# Patient Record
Sex: MALE | Race: WHITE | HISPANIC OR LATINO
[De-identification: names, ages, dates, MRNs, and addresses within clinical notes are randomized per-mention and may not be internally consistent; named-entity substitution may affect disease eponyms.]

---

## 2017-01-05 ENCOUNTER — MEDICATION RENEWAL (OUTPATIENT)
Age: 67
End: 2017-01-05

## 2017-03-09 ENCOUNTER — MEDICATION RENEWAL (OUTPATIENT)
Age: 67
End: 2017-03-09

## 2017-03-16 ENCOUNTER — APPOINTMENT (OUTPATIENT)
Dept: ENDOCRINOLOGY | Facility: CLINIC | Age: 67
End: 2017-03-16

## 2017-03-16 VITALS
HEART RATE: 78 BPM | SYSTOLIC BLOOD PRESSURE: 130 MMHG | HEIGHT: 67 IN | TEMPERATURE: 97.1 F | BODY MASS INDEX: 28.25 KG/M2 | DIASTOLIC BLOOD PRESSURE: 70 MMHG | WEIGHT: 180 LBS

## 2017-03-31 ENCOUNTER — MEDICATION RENEWAL (OUTPATIENT)
Age: 67
End: 2017-03-31

## 2017-05-08 LAB
ALBUMIN SERPL ELPH-MCNC: 4.1 G/DL
ALP BLD-CCNC: 59 U/L
ALT SERPL-CCNC: 20 U/L
ANION GAP SERPL CALC-SCNC: 9 MMOL/L
AST SERPL-CCNC: 22 U/L
BASOPHILS # BLD AUTO: 0.02 K/UL
BASOPHILS NFR BLD AUTO: 0.3 %
BILIRUB SERPL-MCNC: 0.2 MG/DL
BUN SERPL-MCNC: 19 MG/DL
CALCIUM SERPL-MCNC: 8.9 MG/DL
CHLORIDE SERPL-SCNC: 103 MMOL/L
CO2 SERPL-SCNC: 28 MMOL/L
CREAT SERPL-MCNC: 1.18 MG/DL
EOSINOPHIL # BLD AUTO: 0.62 K/UL
EOSINOPHIL NFR BLD AUTO: 8.6 %
GLUCOSE SERPL-MCNC: 138 MG/DL
HBA1C MFR BLD HPLC: 6.9 %
HCT VFR BLD CALC: 39.8 %
HGB BLD-MCNC: 13.2 G/DL
IMM GRANULOCYTES NFR BLD AUTO: 0.1 %
LYMPHOCYTES # BLD AUTO: 2.16 K/UL
LYMPHOCYTES NFR BLD AUTO: 29.8 %
MAN DIFF?: NORMAL
MCHC RBC-ENTMCNC: 30.7 PG
MCHC RBC-ENTMCNC: 33.2 GM/DL
MCV RBC AUTO: 92.6 FL
MONOCYTES # BLD AUTO: 0.57 K/UL
MONOCYTES NFR BLD AUTO: 7.9 %
NEUTROPHILS # BLD AUTO: 3.87 K/UL
NEUTROPHILS NFR BLD AUTO: 53.3 %
PLATELET # BLD AUTO: 238 K/UL
POTASSIUM SERPL-SCNC: 4.7 MMOL/L
PROT SERPL-MCNC: 6.3 G/DL
RBC # BLD: 4.3 M/UL
RBC # FLD: 14.1 %
SODIUM SERPL-SCNC: 140 MMOL/L
THYROPEROXIDASE AB SERPL IA-ACNC: <10 IU/ML
TSH SERPL-ACNC: 2.71 UIU/ML
VIT B12 SERPL-MCNC: 660 PG/ML
WBC # FLD AUTO: 7.25 K/UL

## 2017-05-15 ENCOUNTER — APPOINTMENT (OUTPATIENT)
Dept: ENDOCRINOLOGY | Facility: CLINIC | Age: 67
End: 2017-05-15

## 2017-05-15 VITALS
DIASTOLIC BLOOD PRESSURE: 80 MMHG | HEART RATE: 65 BPM | BODY MASS INDEX: 28.88 KG/M2 | OXYGEN SATURATION: 98 % | SYSTOLIC BLOOD PRESSURE: 110 MMHG | HEIGHT: 67 IN | WEIGHT: 184 LBS | RESPIRATION RATE: 18 BRPM

## 2017-07-18 ENCOUNTER — INPATIENT (INPATIENT)
Facility: HOSPITAL | Age: 67
LOS: 2 days | Discharge: HOME | DRG: 389 | End: 2017-07-21
Attending: SURGERY | Admitting: SURGERY
Payer: COMMERCIAL

## 2017-07-18 VITALS
OXYGEN SATURATION: 97 % | RESPIRATION RATE: 18 BRPM | TEMPERATURE: 98 F | SYSTOLIC BLOOD PRESSURE: 154 MMHG | DIASTOLIC BLOOD PRESSURE: 97 MMHG | WEIGHT: 175.93 LBS | HEIGHT: 70 IN | HEART RATE: 101 BPM

## 2017-07-18 LAB
ALBUMIN SERPL ELPH-MCNC: 4 G/DL — SIGNIFICANT CHANGE UP (ref 3.5–5)
ALP SERPL-CCNC: 80 U/L — SIGNIFICANT CHANGE UP (ref 40–120)
ALT FLD-CCNC: 219 U/L DA — HIGH (ref 10–60)
ANION GAP SERPL CALC-SCNC: 12 MMOL/L — SIGNIFICANT CHANGE UP (ref 5–17)
APPEARANCE UR: CLEAR — SIGNIFICANT CHANGE UP
AST SERPL-CCNC: 70 U/L — HIGH (ref 10–40)
BACTERIA # UR AUTO: ABNORMAL /HPF
BASOPHILS # BLD AUTO: 0.1 K/UL — SIGNIFICANT CHANGE UP (ref 0–0.2)
BASOPHILS NFR BLD AUTO: 0.7 % — SIGNIFICANT CHANGE UP (ref 0–2)
BILIRUB SERPL-MCNC: 0.7 MG/DL — SIGNIFICANT CHANGE UP (ref 0.2–1.2)
BILIRUB UR-MCNC: NEGATIVE — SIGNIFICANT CHANGE UP
BUN SERPL-MCNC: 44 MG/DL — HIGH (ref 7–18)
CALCIUM SERPL-MCNC: 9.2 MG/DL — SIGNIFICANT CHANGE UP (ref 8.4–10.5)
CHLORIDE SERPL-SCNC: 90 MMOL/L — LOW (ref 96–108)
CO2 SERPL-SCNC: 29 MMOL/L — SIGNIFICANT CHANGE UP (ref 22–31)
COLOR SPEC: YELLOW — SIGNIFICANT CHANGE UP
CREAT SERPL-MCNC: 2.2 MG/DL — HIGH (ref 0.5–1.3)
DIFF PNL FLD: ABNORMAL
EOSINOPHIL # BLD AUTO: 0 K/UL — SIGNIFICANT CHANGE UP (ref 0–0.5)
EOSINOPHIL NFR BLD AUTO: 0.1 % — SIGNIFICANT CHANGE UP (ref 0–6)
EPI CELLS # UR: ABNORMAL (ref 0–10)
GLUCOSE SERPL-MCNC: 392 MG/DL — HIGH (ref 70–99)
GLUCOSE UR QL: 1000 MG/DL
GRAN CASTS # UR COMP ASSIST: ABNORMAL
HCT VFR BLD CALC: 47.6 % — SIGNIFICANT CHANGE UP (ref 39–50)
HGB BLD-MCNC: 16.2 G/DL — SIGNIFICANT CHANGE UP (ref 13–17)
HYALINE CASTS # UR AUTO: ABNORMAL
KETONES UR-MCNC: ABNORMAL
LACTATE SERPL-SCNC: 3.1 MMOL/L — HIGH (ref 0.7–2)
LEUKOCYTE ESTERASE UR-ACNC: NEGATIVE — SIGNIFICANT CHANGE UP
LIDOCAIN IGE QN: 124 U/L — SIGNIFICANT CHANGE UP (ref 73–393)
LYMPHOCYTES # BLD AUTO: 1 K/UL — SIGNIFICANT CHANGE UP (ref 1–3.3)
LYMPHOCYTES # BLD AUTO: 13.4 % — SIGNIFICANT CHANGE UP (ref 13–44)
MCHC RBC-ENTMCNC: 31.8 PG — SIGNIFICANT CHANGE UP (ref 27–34)
MCHC RBC-ENTMCNC: 34.1 GM/DL — SIGNIFICANT CHANGE UP (ref 32–36)
MCV RBC AUTO: 93.5 FL — SIGNIFICANT CHANGE UP (ref 80–100)
MONOCYTES # BLD AUTO: 0.9 K/UL — SIGNIFICANT CHANGE UP (ref 0–0.9)
MONOCYTES NFR BLD AUTO: 13 % — SIGNIFICANT CHANGE UP (ref 2–14)
NEUTROPHILS # BLD AUTO: 5.2 K/UL — SIGNIFICANT CHANGE UP (ref 1.8–7.4)
NEUTROPHILS NFR BLD AUTO: 72.8 % — SIGNIFICANT CHANGE UP (ref 43–77)
NITRITE UR-MCNC: NEGATIVE — SIGNIFICANT CHANGE UP
PH UR: 5 — SIGNIFICANT CHANGE UP (ref 5–8)
PLATELET # BLD AUTO: 165 K/UL — SIGNIFICANT CHANGE UP (ref 150–400)
POTASSIUM SERPL-MCNC: 3.6 MMOL/L — SIGNIFICANT CHANGE UP (ref 3.5–5.3)
POTASSIUM SERPL-SCNC: 3.6 MMOL/L — SIGNIFICANT CHANGE UP (ref 3.5–5.3)
PROT SERPL-MCNC: 8.7 G/DL — HIGH (ref 6–8.3)
PROT UR-MCNC: 100
RBC # BLD: 5.09 M/UL — SIGNIFICANT CHANGE UP (ref 4.2–5.8)
RBC # FLD: 12.2 % — SIGNIFICANT CHANGE UP (ref 10.3–14.5)
RBC CASTS # UR COMP ASSIST: ABNORMAL /HPF (ref 0–2)
SODIUM SERPL-SCNC: 131 MMOL/L — LOW (ref 135–145)
SP GR SPEC: 1.02 — SIGNIFICANT CHANGE UP (ref 1.01–1.02)
UROBILINOGEN FLD QL: NEGATIVE — SIGNIFICANT CHANGE UP
WBC # BLD: 7.2 K/UL — SIGNIFICANT CHANGE UP (ref 3.8–10.5)
WBC # FLD AUTO: 7.2 K/UL — SIGNIFICANT CHANGE UP (ref 3.8–10.5)
WBC UR QL: SIGNIFICANT CHANGE UP /HPF (ref 0–5)

## 2017-07-18 PROCEDURE — 74020: CPT | Mod: 26

## 2017-07-18 PROCEDURE — 74176 CT ABD & PELVIS W/O CONTRAST: CPT | Mod: 26

## 2017-07-18 RX ORDER — SODIUM CHLORIDE 9 MG/ML
3 INJECTION INTRAMUSCULAR; INTRAVENOUS; SUBCUTANEOUS ONCE
Qty: 0 | Refills: 0 | Status: COMPLETED | OUTPATIENT
Start: 2017-07-18 | End: 2017-07-18

## 2017-07-18 RX ORDER — FAMOTIDINE 10 MG/ML
20 INJECTION INTRAVENOUS ONCE
Qty: 0 | Refills: 0 | Status: COMPLETED | OUTPATIENT
Start: 2017-07-18 | End: 2017-07-18

## 2017-07-18 RX ORDER — IBUPROFEN 200 MG
1 TABLET ORAL
Qty: 20 | Refills: 0 | OUTPATIENT
Start: 2017-07-18 | End: 2017-08-17

## 2017-07-18 RX ORDER — ONDANSETRON 8 MG/1
4 TABLET, FILM COATED ORAL ONCE
Qty: 0 | Refills: 0 | Status: COMPLETED | OUTPATIENT
Start: 2017-07-18 | End: 2017-07-18

## 2017-07-18 RX ORDER — SODIUM CHLORIDE 9 MG/ML
1000 INJECTION INTRAMUSCULAR; INTRAVENOUS; SUBCUTANEOUS
Qty: 0 | Refills: 0 | Status: DISCONTINUED | OUTPATIENT
Start: 2017-07-18 | End: 2017-07-19

## 2017-07-18 RX ORDER — SODIUM CHLORIDE 9 MG/ML
1000 INJECTION INTRAMUSCULAR; INTRAVENOUS; SUBCUTANEOUS ONCE
Qty: 0 | Refills: 0 | Status: COMPLETED | OUTPATIENT
Start: 2017-07-18 | End: 2017-07-18

## 2017-07-18 RX ADMIN — SODIUM CHLORIDE 1000 MILLILITER(S): 9 INJECTION INTRAMUSCULAR; INTRAVENOUS; SUBCUTANEOUS at 16:53

## 2017-07-18 RX ADMIN — FAMOTIDINE 20 MILLIGRAM(S): 10 INJECTION INTRAVENOUS at 16:53

## 2017-07-18 RX ADMIN — SODIUM CHLORIDE 125 MILLILITER(S): 9 INJECTION INTRAMUSCULAR; INTRAVENOUS; SUBCUTANEOUS at 17:32

## 2017-07-18 RX ADMIN — SODIUM CHLORIDE 3 MILLILITER(S): 9 INJECTION INTRAMUSCULAR; INTRAVENOUS; SUBCUTANEOUS at 16:53

## 2017-07-18 RX ADMIN — ONDANSETRON 4 MILLIGRAM(S): 8 TABLET, FILM COATED ORAL at 16:53

## 2017-07-18 NOTE — ED PROVIDER NOTE - OBJECTIVE STATEMENT
68 y/o M pt w/ PMHx of liver abscess and DM presents to ED c/o diarrhea (brown) x3 days and vomiting (1 episode) 2 days ago. Pt notes that he has been on a light diet for the last 3 days. Pt's blood sugar was 360-390 today; pt saw his PMD (Dr. Tran) who sent him to the ED for possible dehydration. Pt denies abd pain, fever, chills, dysuria, hematuria, cough, or any other complaints. Pt reports normal flatus. Pt states that he recently traveled to [place] (returned 5 days ago). NKDA.

## 2017-07-18 NOTE — ED ADULT NURSE NOTE - OBJECTIVE STATEMENT
As per pt, "I started having diarrhea on Saturday. On Monday after hicupping I threw up. When I threw up it seemed like I threw up everything because I threw up my Janumet tablets that I took on Saturday." Pt was recommended to go to the ER my PCP. Currently denies N/V and abd pain.

## 2017-07-19 DIAGNOSIS — K56.69 OTHER INTESTINAL OBSTRUCTION: ICD-10-CM

## 2017-07-19 DIAGNOSIS — K75.0 ABSCESS OF LIVER: Chronic | ICD-10-CM

## 2017-07-19 DIAGNOSIS — E11.9 TYPE 2 DIABETES MELLITUS WITHOUT COMPLICATIONS: ICD-10-CM

## 2017-07-19 DIAGNOSIS — N17.9 ACUTE KIDNEY FAILURE, UNSPECIFIED: ICD-10-CM

## 2017-07-19 LAB
ANION GAP SERPL CALC-SCNC: 8 MMOL/L — SIGNIFICANT CHANGE UP (ref 5–17)
BUN SERPL-MCNC: 27 MG/DL — HIGH (ref 7–18)
CALCIUM SERPL-MCNC: 7.4 MG/DL — LOW (ref 8.4–10.5)
CHLORIDE SERPL-SCNC: 102 MMOL/L — SIGNIFICANT CHANGE UP (ref 96–108)
CO2 SERPL-SCNC: 29 MMOL/L — SIGNIFICANT CHANGE UP (ref 22–31)
CREAT SERPL-MCNC: 1.31 MG/DL — HIGH (ref 0.5–1.3)
GLUCOSE SERPL-MCNC: 296 MG/DL — HIGH (ref 70–99)
HBA1C BLD-MCNC: 8.1 % — HIGH (ref 4–5.6)
LACTATE SERPL-SCNC: 1.2 MMOL/L — SIGNIFICANT CHANGE UP (ref 0.7–2)
OSMOLALITY UR: 643 MOS/KG — SIGNIFICANT CHANGE UP (ref 50–1200)
POTASSIUM SERPL-MCNC: 3.8 MMOL/L — SIGNIFICANT CHANGE UP (ref 3.5–5.3)
POTASSIUM SERPL-SCNC: 3.8 MMOL/L — SIGNIFICANT CHANGE UP (ref 3.5–5.3)
PROT ?TM UR-MCNC: 42 MG/DL — HIGH (ref 0–12)
SODIUM SERPL-SCNC: 139 MMOL/L — SIGNIFICANT CHANGE UP (ref 135–145)
SODIUM UR-SCNC: 90 MMOL/L — SIGNIFICANT CHANGE UP (ref 40–220)

## 2017-07-19 PROCEDURE — 99223 1ST HOSP IP/OBS HIGH 75: CPT

## 2017-07-19 PROCEDURE — 74020: CPT | Mod: 26

## 2017-07-19 PROCEDURE — 99285 EMERGENCY DEPT VISIT HI MDM: CPT

## 2017-07-19 RX ORDER — DEXTROSE 50 % IN WATER 50 %
25 SYRINGE (ML) INTRAVENOUS ONCE
Qty: 0 | Refills: 0 | Status: DISCONTINUED | OUTPATIENT
Start: 2017-07-19 | End: 2017-07-21

## 2017-07-19 RX ORDER — SODIUM CHLORIDE 9 MG/ML
1000 INJECTION, SOLUTION INTRAVENOUS
Qty: 0 | Refills: 0 | Status: DISCONTINUED | OUTPATIENT
Start: 2017-07-19 | End: 2017-07-20

## 2017-07-19 RX ORDER — MORPHINE SULFATE 50 MG/1
2 CAPSULE, EXTENDED RELEASE ORAL EVERY 4 HOURS
Qty: 0 | Refills: 0 | Status: DISCONTINUED | OUTPATIENT
Start: 2017-07-19 | End: 2017-07-21

## 2017-07-19 RX ORDER — SODIUM CHLORIDE 9 MG/ML
1000 INJECTION, SOLUTION INTRAVENOUS
Qty: 0 | Refills: 0 | Status: DISCONTINUED | OUTPATIENT
Start: 2017-07-19 | End: 2017-07-21

## 2017-07-19 RX ORDER — INSULIN GLARGINE 100 [IU]/ML
10 INJECTION, SOLUTION SUBCUTANEOUS EVERY MORNING
Qty: 0 | Refills: 0 | Status: DISCONTINUED | OUTPATIENT
Start: 2017-07-19 | End: 2017-07-21

## 2017-07-19 RX ORDER — HEPARIN SODIUM 5000 [USP'U]/ML
5000 INJECTION INTRAVENOUS; SUBCUTANEOUS EVERY 8 HOURS
Qty: 0 | Refills: 0 | Status: DISCONTINUED | OUTPATIENT
Start: 2017-07-19 | End: 2017-07-21

## 2017-07-19 RX ORDER — DEXTROSE 50 % IN WATER 50 %
12.5 SYRINGE (ML) INTRAVENOUS ONCE
Qty: 0 | Refills: 0 | Status: DISCONTINUED | OUTPATIENT
Start: 2017-07-19 | End: 2017-07-21

## 2017-07-19 RX ORDER — INSULIN LISPRO 100/ML
VIAL (ML) SUBCUTANEOUS
Qty: 0 | Refills: 0 | Status: DISCONTINUED | OUTPATIENT
Start: 2017-07-19 | End: 2017-07-19

## 2017-07-19 RX ORDER — ONDANSETRON 8 MG/1
4 TABLET, FILM COATED ORAL EVERY 6 HOURS
Qty: 0 | Refills: 0 | Status: DISCONTINUED | OUTPATIENT
Start: 2017-07-19 | End: 2017-07-21

## 2017-07-19 RX ORDER — DEXTROSE 50 % IN WATER 50 %
1 SYRINGE (ML) INTRAVENOUS ONCE
Qty: 0 | Refills: 0 | Status: DISCONTINUED | OUTPATIENT
Start: 2017-07-19 | End: 2017-07-21

## 2017-07-19 RX ORDER — HYDRALAZINE HCL 50 MG
10 TABLET ORAL EVERY 8 HOURS
Qty: 0 | Refills: 0 | Status: DISCONTINUED | OUTPATIENT
Start: 2017-07-19 | End: 2017-07-21

## 2017-07-19 RX ORDER — GLUCAGON INJECTION, SOLUTION 0.5 MG/.1ML
1 INJECTION, SOLUTION SUBCUTANEOUS ONCE
Qty: 0 | Refills: 0 | Status: DISCONTINUED | OUTPATIENT
Start: 2017-07-19 | End: 2017-07-21

## 2017-07-19 RX ORDER — INSULIN LISPRO 100/ML
VIAL (ML) SUBCUTANEOUS EVERY 6 HOURS
Qty: 0 | Refills: 0 | Status: DISCONTINUED | OUTPATIENT
Start: 2017-07-19 | End: 2017-07-21

## 2017-07-19 RX ORDER — METOPROLOL TARTRATE 50 MG
1 TABLET ORAL
Qty: 0 | Refills: 0 | COMMUNITY

## 2017-07-19 RX ADMIN — HEPARIN SODIUM 5000 UNIT(S): 5000 INJECTION INTRAVENOUS; SUBCUTANEOUS at 05:42

## 2017-07-19 RX ADMIN — Medication 4: at 12:05

## 2017-07-19 RX ADMIN — INSULIN GLARGINE 10 UNIT(S): 100 INJECTION, SOLUTION SUBCUTANEOUS at 09:33

## 2017-07-19 RX ADMIN — SODIUM CHLORIDE 150 MILLILITER(S): 9 INJECTION, SOLUTION INTRAVENOUS at 22:20

## 2017-07-19 RX ADMIN — Medication 6: at 06:25

## 2017-07-19 RX ADMIN — Medication 2: at 17:19

## 2017-07-19 RX ADMIN — HEPARIN SODIUM 5000 UNIT(S): 5000 INJECTION INTRAVENOUS; SUBCUTANEOUS at 22:15

## 2017-07-19 RX ADMIN — SODIUM CHLORIDE 150 MILLILITER(S): 9 INJECTION, SOLUTION INTRAVENOUS at 01:17

## 2017-07-19 NOTE — PROGRESS NOTE ADULT - SUBJECTIVE AND OBJECTIVE BOX
Patient with SBO that is resolving  Passing flatus and having multiple loose bowel movements  Denies abdominal pain, nausea and vomiting  NGT to LWS only putting out 150cc  NPO        T(F): 97.7 (07-19-17 @ 06:09), Max: 98.2 (07-18-17 @ 15:30)  HR: 74 (07-19-17 @ 06:09) (74 - 101)  BP: 132/72 (07-19-17 @ 06:09) (132/72 - 154/97)  RR: 18 (07-19-17 @ 06:09) (18 - 18)  SpO2: 99% (07-19-17 @ 06:09) (96% - 99%)        Nasoenteral Tube: 150 mL        Physical Exam  General: AAOx3, No acute distress  Skin: No jaundice, no icterus  Abdomen: soft, nontender, mildly distended, tympanic to percussion, no rebound tenderness, no guarding, no palpable masses, large midline incisional hernia, loss of domain of abdominal wall  : Normal external genitalia  Extremities: non edematous, no calf pain bilaterally

## 2017-07-19 NOTE — H&P ADULT - HISTORY OF PRESENT ILLNESS
68 y/o male with DM, HTN, h/o liver abscess in 2009; had open surgery here for liver abscess with post-op wound closure secondarily  post-op had developed mult abd ventral hernias which have not given him any problem; pt wears abd binder  Pt comes with c/o diarrhea for several days with an episode of vomiting; also acknowledges flatus  no pain, f/c  CT done in ED read as consistent with a small bowel obstruction. Marked thinning of   the ventral abdominal wall musculature with diastasis of the rectus   abdominis musculature and wide neck ventral abdominal wall hernia.   Numerous dilated proximal small bowel loops within the central abdomen   which enter and exit the hernia sac. Distal small bowel loops are   decompressed at the point of entry into the right lower quadrant ventral   abdominal wall hernia which contains the cecum and terminal ileum. No   mesenteric edema or ascites. No pneumoperitoneum or pneumatosis.

## 2017-07-19 NOTE — H&P ADULT - PROBLEM SELECTOR PLAN 1
npo, cont NGT, hydrate, f/u am BMP, lactate levels, medical consult called for elevated BS/mgmt of HTN, DM; f/u am xray, serial abd exams

## 2017-07-19 NOTE — H&P ADULT - NSHPPHYSICALEXAM_GEN_ALL_CORE
NAD  alert, oriented x3  NGT placed by ED-draining some bilious  S1S2  abd soft, midline scar well healed; upper and lower abd ventral hernias appear reducible  pt is NT/ND with no rebound, guarding or peritoneal signs  ext no c/c/e

## 2017-07-19 NOTE — CONSULT NOTE ADULT - SUBJECTIVE AND OBJECTIVE BOX
Patient is a 67y old  Male who presents with a chief complaint of abd pain, diarrhea; sbo (2017 00:53)      Initial HPI on admission:  HPI:  68 y/o male with DM, HTN, h/o liver abscess in ; had open surgery here for liver abscess with post-op wound closure secondarily  post-op had developed mult abd ventral hernias which have not given him any problem; pt wears abd binder  Pt comes with c/o diarrhea for several days with an episode of vomiting; also acknowledges flatus  no pain, f/c  CT done in ED read as consistent with a small bowel obstruction. Marked thinning of   the ventral abdominal wall musculature with diastasis of the rectus   abdominis musculature and wide neck ventral abdominal wall hernia.   Numerous dilated proximal small bowel loops within the central abdomen   which enter and exit the hernia sac. Distal small bowel loops are   decompressed at the point of entry into the right lower quadrant ventral   abdominal wall hernia which contains the cecum and terminal ileum. No   mesenteric edema or ascites. No pneumoperitoneum or pneumatosis. (2017 00:53)  Medicine Service is being Consulted for management of diabetes and HTN    PAST MEDICAL & SURGICAL HISTORY:  DM (diabetes mellitus)  Liver abscess  Liver abscess: Drainage of liver abscess    Allergies    No Known Allergies    Intolerances      FAMILY HISTORY:    Social history reviewed:     Review of Systems:  CONSTITUTIONAL: No fever, chills, or fatigue  EYES: No eye pain, visual disturbances, or discharge  ENMT:  No difficulty hearing, tinnitus, vertigo; No sinus or throat pain  NECK: No pain or stiffness  RESPIRATORY: No cough, wheezing, chills or hemoptysis; No shortness of breath  CARDIOVASCULAR: No chest pain, palpitations, dizziness, or leg swelling  GASTROINTESTINAL: No abdominal or epigastric pain. No nausea, vomiting, or hematemesis; No diarrhea or constipation. No melena or hematochezia.  GENITOURINARY: No dysuria, frequency, hematuria, or incontinence  NEUROLOGICAL: No headaches, memory loss, loss of strength, numbness, or tremors  SKIN: No itching, burning, rashes, or lesions   MUSCULOSKELETAL: No joint pain or swelling; No muscle, back, or extremity pain  PSYCHIATRIC: No depression, anxiety, mood swings, or difficulty sleeping      Medications:  ondansetron Injectable 4 milliGRAM(s) IV Push every 6 hours PRN  insulin lispro (HumaLOG) corrective regimen sliding scale   SubCutaneous three times a day before meals  dextrose 5%. 1000 milliLiter(s) IV Continuous <Continuous>  dextrose Gel 1 Dose(s) Oral once PRN  dextrose 50% Injectable 12.5 Gram(s) IV Push once  dextrose 50% Injectable 25 Gram(s) IV Push once  dextrose 50% Injectable 25 Gram(s) IV Push once  glucagon  Injectable 1 milliGRAM(s) IntraMuscular once PRN  dextrose 5% + sodium chloride 0.9%. 1000 milliLiter(s) IV Continuous <Continuous>  heparin  Injectable 5000 Unit(s) SubCutaneous every 8 hours  morphine  - Injectable 2 milliGRAM(s) IV Push every 4 hours PRN        Vital Signs Last 24 Hrs  T(C): 36.6 (2017 01:33), Max: 36.8 (2017 15:30)  T(F): 97.8 (:33), Max: 98.2 (2017 15:30)  HR: 87 (2017 01:33) (83 - 101)  BP: 153/83 (2017 01:33) (140/90 - 154/97)  BP(mean): --  RR: 18 (2017 01:33) (18 - 18)  SpO2: 96% (2017 01:33) (96% - 99%)          Physical Examination:    General: No acute distress.      HEENT: Pupils equal, reactive to light.  Symmetric.    PULM: Clear to auscultation bilaterally, no significant sputum production    CVS: Regular rate and rhythm, no murmurs, rubs, or gallops    ABD: Soft, nondistended, nontender, normoactive bowel sounds, no masses    EXT: No edema, nontender    SKIN: Warm and well perfused, no rashes noted.    NEURO: Alert, oriented, interactive, nonfocal      LABS:                        16.2   7.2   )-----------( 165      ( 2017 16:57 )             47.6     07-18    131<L>  |  90<L>  |  44<H>  ----------------------------<  392<H>  3.6   |  29  |  2.20<H>    Ca    9.2      2017 16:57    TPro  8.7<H>  /  Alb  4.0  /  TBili  0.7  /  DBili  x   /  AST  70<H>  /  ALT  219<H>  /  AlkPhos  80            CAPILLARY BLOOD GLUCOSE  254 (2017 23:50)          Urinalysis Basic - ( 2017 16:57 )    Color: Yellow / Appearance: Clear / S.025 / pH: x  Gluc: x / Ketone: Trace  / Bili: Negative / Urobili: Negative   Blood: x / Protein: 100 / Nitrite: Negative   Leuk Esterase: Negative / RBC: 5-10 /HPF / WBC 0-2 /HPF   Sq Epi: x / Non Sq Epi: Few / Bacteria: Moderate /HPF      CULTURES:          RADIOLOGY REVIEWED : < from: CT Abdomen and Pelvis w/ Oral Cont (17 @ 21:24) >    EXAM:  CT ABDOMEN AND PELVIS OC                            PROCEDURE DATE:  2017          INTERPRETATION:  CT OF THE ABDOMEN AND PELVIS DATED 2017.    CLINICAL INFORMATION:  Diarrhea and vomiting for 2 days, history of liver   abscess surgery in the past.    TECHNIQUE: Axial CT images are obtained from the lung bases through the   pubic symphysis without the administration of intravenous contrast. Oral   contrast is administered. Images are reformatted in the sagittal and   coronal planes.    No prior studies are available for comparison.     FINDINGS:    There is patchy groundglass opacity in the right middle lobe which may   represent developing pneumonia. There are groundglass nodules in the   right lower lobe measuring upto 4 mm.    The unenhanced liver is unremarkable. The gallbladder is normally   distended. High attenuation within the gallbladder may represent   sludge/stones or vicarious excretion of contrast from recent IV contrast   enhanced study. There is no biliary ductal dilatation. The pancreas is   mildly atrophic. The unenhanced spleen, adrenal glands, and kidneys are   unremarkable. The urinary bladder is under distended but unremarkable in   appearance. The prostate gland is enlarged.    There is marked thinning of the ventral abdominal wall musculature with   diastasis of the rectus abdominis musculature and a wide neck ventral   abdominal wall hernia. There are numerous dilated proximal small bowel   loops within the central abdomen which enter and exit the hernia sac.   Distal small bowel loops are decompressed at the point of entry into the   right lower ventral abdominal wall hernia which contains the cecum and   terminal ileum. Large bowel is relatively collapsed. There is no   mesenteric edema or ascites. There is no pneumoperitoneum or pneumatosis.   There is colonic diverticulosis without evidence of diverticulitis.    There is no significant abdominal or pelvic lymphadenopathy. There is   mild atherosclerotic calcification of the aortoiliac tree. The abdominal   aorta is normal in caliber.    There are small fat-containing bilateral inguinal hernias.    There are multilevel degenerative changes of the spine with a levoconvex   scoliosis.    IMPRESSION:    Findings consistent with a small bowel obstruction. Marked thinning of   the ventral abdominal wall musculature with diastasis of the rectus   abdominis musculature and wide neck ventral abdominal wall hernia.   Numerous dilated proximal small bowel loops within the central abdomen   which enter and exit the hernia sac. Distal small bowel loops are   decompressed at the point of entry into the right lower quadrant ventral   abdominal wall hernia which contains the cecum and terminal ileum. No   mesenteric edema orascites. No pneumoperitoneum or pneumatosis.    Patchy groundglass opacity in the right middle lobe which may represent   developing pneumonia.                    CHAD MORALES M.D.; ATTENDING RADIOLOGIST  This document has been electronically signed. 2017  9:54PM                < end of copied text >      Assessment and plan:  68 y/o male with DM, HTN, h/o liver abscess in , w/ post-op multiple ventral abdominal hernias p/w abdominal pain, diarrhea and vomiting found to have SBO    1) SBO: NPO, NGT to Sx, IVFs'. Patient is able to pass Gas. Management as per surgery    2) MIKE: unknown baseline. hold Diuretic. f/up urine lytes. Monitor bun/Cr. US renal if Cr does not improve. c/w IVFs  3) Elevated LFts: check hepatitis panel. [place]  4) HTN: c/w home dose toprol XL. hold diuretic for now  5) DM: check Hba1c. c/w HSS for now

## 2017-07-19 NOTE — PATIENT PROFILE ADULT. - NS TRANSFER PATIENT BELONGINGS
Cell Phone/PDA (specify)/Electronic Device (specify)/laptop, luggage, credit cards and money placed in safe/Jewelry/Money (specify)

## 2017-07-19 NOTE — CONSULT NOTE ADULT - SUBJECTIVE AND OBJECTIVE BOX
Subjective and Objective:   Patient is a 67y old  Male who presents with a chief complaint of abd pain, diarrhea; sbo (2017 00:53)      Initial HPI on admission:  HPI:  68 y/o male with DM, HTN, h/o liver abscess in ; had open surgery here for liver abscess with post-op wound closure secondarily  post-op had developed mult abd ventral hernias which have not given him any problem; pt wears abd binder  Pt comes with c/o diarrhea for several days with an episode of vomiting; also acknowledges flatus  no pain, f/c  CT done in ED read as consistent with a small bowel obstruction. Marked thinning of   the ventral abdominal wall musculature with diastasis of the rectus   abdominis musculature and wide neck ventral abdominal wall hernia.   Numerous dilated proximal small bowel loops within the central abdomen   which enter and exit the hernia sac. Distal small bowel loops are   decompressed at the point of entry into the right lower quadrant ventral   abdominal wall hernia which contains the cecum and terminal ileum. No   mesenteric edema or ascites. No pneumoperitoneum or pneumatosis. (2017 00:53)  Medicine Service is being Consulted for management of diabetes and HTN    PAST MEDICAL & SURGICAL HISTORY:  DM (diabetes mellitus)  Liver abscess  Liver abscess: Drainage of liver abscess    Allergies    No Known Allergies    Intolerances      FAMILY HISTORY:    Social history reviewed:     Review of Systems:  CONSTITUTIONAL: No fever, chills, or fatigue  EYES: No eye pain, visual disturbances, or discharge  ENMT:  No difficulty hearing, tinnitus, vertigo; No sinus or throat pain  NECK: No pain or stiffness  RESPIRATORY: No cough, wheezing, chills or hemoptysis; No shortness of breath  CARDIOVASCULAR: No chest pain, palpitations, dizziness, or leg swelling  GASTROINTESTINAL: No abdominal or epigastric pain. No nausea, vomiting, or hematemesis; No diarrhea or constipation. No melena or hematochezia.  GENITOURINARY: No dysuria, frequency, hematuria, or incontinence  NEUROLOGICAL: No headaches, memory loss, loss of strength, numbness, or tremors  SKIN: No itching, burning, rashes, or lesions   MUSCULOSKELETAL: No joint pain or swelling; No muscle, back, or extremity pain  PSYCHIATRIC: No depression, anxiety, mood swings, or difficulty sleeping      Medications:  ondansetron Injectable 4 milliGRAM(s) IV Push every 6 hours PRN  insulin lispro (HumaLOG) corrective regimen sliding scale   SubCutaneous three times a day before meals  dextrose 5%. 1000 milliLiter(s) IV Continuous <Continuous>  dextrose Gel 1 Dose(s) Oral once PRN  dextrose 50% Injectable 12.5 Gram(s) IV Push once  dextrose 50% Injectable 25 Gram(s) IV Push once  dextrose 50% Injectable 25 Gram(s) IV Push once  glucagon  Injectable 1 milliGRAM(s) IntraMuscular once PRN  dextrose 5% + sodium chloride 0.9%. 1000 milliLiter(s) IV Continuous <Continuous>  heparin  Injectable 5000 Unit(s) SubCutaneous every 8 hours  morphine  - Injectable 2 milliGRAM(s) IV Push every 4 hours PRN        Vital Signs Last 24 Hrs  T(C): 36.6 (2017 01:33), Max: 36.8 (2017 15:30)  T(F): 97.8 (:33), Max: 98.2 (2017 15:30)  HR: 87 (:) (83 - 101)  BP: 153/83 (:) (140/90 - 154/97)  BP(mean): --  RR: 18 (2017 01:33) (18 - 18)  SpO2: 96% (2017 01:33) (96% - 99%)          Physical Examination:    General: No acute distress.      HEENT: Pupils equal, reactive to light.  Symmetric.    PULM: Clear to auscultation bilaterally, no significant sputum production    CVS: Regular rate and rhythm, no murmurs, rubs, or gallops    ABD: Soft, nondistended, nontender, normoactive bowel sounds, no masses    EXT: No edema, nontender    SKIN: Warm and well perfused, no rashes noted.    NEURO: Alert, oriented, interactive, nonfocal      LABS:                        16.2   7.2   )-----------( 165      ( 2017 16:57 )             47.6     07-18    131<L>  |  90<L>  |  44<H>  ----------------------------<  392<H>  3.6   |  29  |  2.20<H>    Ca    9.2      2017 16:57    TPro  8.7<H>  /  Alb  4.0  /  TBili  0.7  /  DBili  x   /  AST  70<H>  /  ALT  219<H>  /  AlkPhos  80            CAPILLARY BLOOD GLUCOSE  254 (2017 23:50)          Urinalysis Basic - ( 2017 16:57 )    Color: Yellow / Appearance: Clear / S.025 / pH: x  Gluc: x / Ketone: Trace  / Bili: Negative / Urobili: Negative   Blood: x / Protein: 100 / Nitrite: Negative   Leuk Esterase: Negative / RBC: 5-10 /HPF / WBC 0-2 /HPF   Sq Epi: x / Non Sq Epi: Few / Bacteria: Moderate /HPF      CULTURES:          RADIOLOGY REVIEWED : < from: CT Abdomen and Pelvis w/ Oral Cont (17 @ 21:24) >    EXAM:  CT ABDOMEN AND PELVIS OC                            PROCEDURE DATE:  2017          INTERPRETATION:  CT OF THE ABDOMEN AND PELVIS DATED 2017.    CLINICAL INFORMATION:  Diarrhea and vomiting for 2 days, history of liver   abscess surgery in the past.    TECHNIQUE: Axial CT images are obtained from the lung bases through the   pubic symphysis without the administration of intravenous contrast. Oral   contrast is administered. Images are reformatted in the sagittal and   coronal planes.    No prior studies are available for comparison.     FINDINGS:    There is patchy groundglass opacity in the right middle lobe which may   represent developing pneumonia. There are groundglass nodules in the   right lower lobe measuring upto 4 mm.    The unenhanced liver is unremarkable. The gallbladder is normally   distended. High attenuation within the gallbladder may represent   sludge/stones or vicarious excretion of contrast from recent IV contrast   enhanced study. There is no biliary ductal dilatation. The pancreas is   mildly atrophic. The unenhanced spleen, adrenal glands, and kidneys are   unremarkable. The urinary bladder is under distended but unremarkable in   appearance. The prostate gland is enlarged.    There is marked thinning of the ventral abdominal wall musculature with   diastasis of the rectus abdominis musculature and a wide neck ventral   abdominal wall hernia. There are numerous dilated proximal small bowel   loops within the central abdomen which enter and exit the hernia sac.   Distal small bowel loops are decompressed at the point of entry into the   right lower ventral abdominal wall hernia which contains the cecum and   terminal ileum. Large bowel is relatively collapsed. There is no   mesenteric edema or ascites. There is no pneumoperitoneum or pneumatosis.   There is colonic diverticulosis without evidence of diverticulitis.    There is no significant abdominal or pelvic lymphadenopathy. There is   mild atherosclerotic calcification of the aortoiliac tree. The abdominal   aorta is normal in caliber.    There are small fat-containing bilateral inguinal hernias.    There are multilevel degenerative changes of the spine with a levoconvex   scoliosis.    IMPRESSION:    Findings consistent with a small bowel obstruction. Marked thinning of   the ventral abdominal wall musculature with diastasis of the rectus   abdominis musculature and wide neck ventral abdominal wall hernia.   Numerous dilated proximal small bowel loops within the central abdomen   which enter and exit the hernia sac. Distal small bowel loops are   decompressed at the point of entry into the right lower quadrant ventral   abdominal wall hernia which contains the cecum and terminal ileum. No   mesenteric edema orascites. No pneumoperitoneum or pneumatosis.    Patchy groundglass opacity in the right middle lobe which may represent   developing pneumonia.                    CHAD MORALES M.D.; ATTENDING RADIOLOGIST  This document has been electronically signed. 2017  9:54PM           Assessment and plan:  68 y/o male with DM, HTN, h/o liver abscess in , w/ post-op multiple ventral abdominal hernias p/w abdominal pain, diarrhea and vomiting found to have SBO    1) SBO: NPO, NGT to Sx, IVFs'. Patient is able to pass Gas. Management as per surgery    2) MIKE: unknown baseline. will hold Diuretic. f/up Urine lytes. Monitor bun/Cr. US renal if Cr does not improve. c/w IVFs for now. Send urine lytes    3) Elevated LFTs: check hepatitis panel. [facility]ior    4) HTN: holding Toprol and HCTZ for now until patient is NPO. c/w IV hydralazine IV q 8 hours PRN with parameters    5) DM: check Hba1c. c/w HSS for now. holding PO Janumet    6) DVT ppx Subjective and Objective:   Patient is a 67y old  Male who presents with a chief complaint of abd pain, diarrhea; sbo (2017 00:53)      Initial HPI on admission:  HPI:  66 y/o male with DM, HTN, h/o liver abscess in ; had open surgery here for liver abscess with post-op wound closure secondarily  post-op had developed mult abd ventral hernias which have not given him any problem; pt wears abd binder  Pt comes with c/o diarrhea for several days with an episode of vomiting; also acknowledges flatus  no pain, f/c  CT done in ED read as consistent with a small bowel obstruction. Marked thinning of   the ventral abdominal wall musculature with diastasis of the rectus   abdominis musculature and wide neck ventral abdominal wall hernia.   Numerous dilated proximal small bowel loops within the central abdomen   which enter and exit the hernia sac. Distal small bowel loops are   decompressed at the point of entry into the right lower quadrant ventral   abdominal wall hernia which contains the cecum and terminal ileum. No   mesenteric edema or ascites. No pneumoperitoneum or pneumatosis. (2017 00:53)  Medicine Service is being Consulted for management of diabetes and HTN    PAST MEDICAL & SURGICAL HISTORY:  DM (diabetes mellitus)  Liver abscess  Liver abscess: Drainage of liver abscess    Allergies    No Known Allergies    Intolerances      FAMILY HISTORY:    Social history reviewed:     Review of Systems:  CONSTITUTIONAL: No fever, chills, or fatigue  EYES: No eye pain, visual disturbances, or discharge  ENMT:  No difficulty hearing, tinnitus, vertigo; No sinus or throat pain  NECK: No pain or stiffness  RESPIRATORY: No cough, wheezing, chills or hemoptysis; No shortness of breath  CARDIOVASCULAR: No chest pain, palpitations, dizziness, or leg swelling  GASTROINTESTINAL: abdominal pain, Nausea/vomiting and diarrhea  GENITOURINARY: No dysuria, frequency, hematuria, or incontinence  NEUROLOGICAL: No headaches, memory loss, loss of strength, numbness, or tremors  SKIN: No itching, burning, rashes, or lesions   MUSCULOSKELETAL: No joint pain or swelling; No muscle, back, or extremity pain  PSYCHIATRIC: No depression, anxiety, mood swings, or difficulty sleeping      Medications:  ondansetron Injectable 4 milliGRAM(s) IV Push every 6 hours PRN  insulin lispro (HumaLOG) corrective regimen sliding scale   SubCutaneous three times a day before meals  dextrose 5%. 1000 milliLiter(s) IV Continuous <Continuous>  dextrose Gel 1 Dose(s) Oral once PRN  dextrose 50% Injectable 12.5 Gram(s) IV Push once  dextrose 50% Injectable 25 Gram(s) IV Push once  dextrose 50% Injectable 25 Gram(s) IV Push once  glucagon  Injectable 1 milliGRAM(s) IntraMuscular once PRN  dextrose 5% + sodium chloride 0.9%. 1000 milliLiter(s) IV Continuous <Continuous>  heparin  Injectable 5000 Unit(s) SubCutaneous every 8 hours  morphine  - Injectable 2 milliGRAM(s) IV Push every 4 hours PRN        Vital Signs Last 24 Hrs  T(C): 36.6 (2017 01:33), Max: 36.8 (2017 15:30)  T(F): 97.8 (:33), Max: 98.2 (2017 15:30)  HR: 87 (:33) (83 - 101)  BP: 153/83 (2017 01:33) (140/90 - 154/97)  BP(mean): --  RR: 18 (:33) (18 - 18)  SpO2: 96% (2017 01:33) (96% - 99%)          Physical Examination:    General: No acute distress.      HEENT: Pupils equal, reactive to light.  Symmetric.    PULM: Clear to auscultation bilaterally, no significant sputum production    CVS: Regular rate and rhythm, no murmurs, rubs, or gallops    Abdomen: NGT placed by ED-draining some bilious Abd soft, midline scar well healed; upper and lower abd ventral hernias appear reducible    Ect: no edema/cyanosis      LABS:                        16.2   7.2   )-----------( 165      ( 2017 16:57 )             47.6     07-18    131<L>  |  90<L>  |  44<H>  ----------------------------<  392<H>  3.6   |  29  |  2.20<H>    Ca    9.2      2017 16:57    TPro  8.7<H>  /  Alb  4.0  /  TBili  0.7  /  DBili  x   /  AST  70<H>  /  ALT  219<H>  /  AlkPhos  80  07-18          CAPILLARY BLOOD GLUCOSE  254 (2017 23:50)          Urinalysis Basic - ( 2017 16:57 )    Color: Yellow / Appearance: Clear / S.025 / pH: x  Gluc: x / Ketone: Trace  / Bili: Negative / Urobili: Negative   Blood: x / Protein: 100 / Nitrite: Negative   Leuk Esterase: Negative / RBC: 5-10 /HPF / WBC 0-2 /HPF   Sq Epi: x / Non Sq Epi: Few / Bacteria: Moderate /HPF      CULTURES:          RADIOLOGY REVIEWED : < from: CT Abdomen and Pelvis w/ Oral Cont (17 @ 21:24) >    EXAM:  CT ABDOMEN AND PELVIS OC                            PROCEDURE DATE:  2017          INTERPRETATION:  CT OF THE ABDOMEN AND PELVIS DATED 2017.    CLINICAL INFORMATION:  Diarrhea and vomiting for 2 days, history of liver   abscess surgery in the past.    TECHNIQUE: Axial CT images are obtained from the lung bases through the   pubic symphysis without the administration of intravenous contrast. Oral   contrast is administered. Images are reformatted in the sagittal and   coronal planes.    No prior studies are available for comparison.     FINDINGS:    There is patchy groundglass opacity in the right middle lobe which may   represent developing pneumonia. There are groundglass nodules in the   right lower lobe measuring upto 4 mm.    The unenhanced liver is unremarkable. The gallbladder is normally   distended. High attenuation within the gallbladder may represent   sludge/stones or vicarious excretion of contrast from recent IV contrast   enhanced study. There is no biliary ductal dilatation. The pancreas is   mildly atrophic. The unenhanced spleen, adrenal glands, and kidneys are   unremarkable. The urinary bladder is under distended but unremarkable in   appearance. The prostate gland is enlarged.    There is marked thinning of the ventral abdominal wall musculature with   diastasis of the rectus abdominis musculature and a wide neck ventral   abdominal wall hernia. There are numerous dilated proximal small bowel   loops within the central abdomen which enter and exit the hernia sac.   Distal small bowel loops are decompressed at the point of entry into the   right lower ventral abdominal wall hernia which contains the cecum and   terminal ileum. Large bowel is relatively collapsed. There is no   mesenteric edema or ascites. There is no pneumoperitoneum or pneumatosis.   There is colonic diverticulosis without evidence of diverticulitis.    There is no significant abdominal or pelvic lymphadenopathy. There is   mild atherosclerotic calcification of the aortoiliac tree. The abdominal   aorta is normal in caliber.    There are small fat-containing bilateral inguinal hernias.    There are multilevel degenerative changes of the spine with a levoconvex   scoliosis.    IMPRESSION:    Findings consistent with a small bowel obstruction. Marked thinning of   the ventral abdominal wall musculature with diastasis of the rectus   abdominis musculature and wide neck ventral abdominal wall hernia.   Numerous dilated proximal small bowel loops within the central abdomen   which enter and exit the hernia sac. Distal small bowel loops are   decompressed at the point of entry into the right lower quadrant ventral   abdominal wall hernia which contains the cecum and terminal ileum. No   mesenteric edema orascites. No pneumoperitoneum or pneumatosis.    Patchy groundglass opacity in the right middle lobe which may represent   developing pneumonia.                    CHAD MORALES M.D.; ATTENDING RADIOLOGIST  This document has been electronically signed. 2017  9:54PM           Assessment and plan:  66 y/o male with DM, HTN, h/o liver abscess in , w/ post-op multiple ventral abdominal hernias p/w abdominal pain, diarrhea and vomiting found to have SBO    1) SBO: NPO, NGT to Sx, IVFs'. Patient is able to pass Gas. Management as per surgery    2) MIKE: unknown baseline. will hold Diuretic. f/up Urine lytes. Monitor bun/Cr. US renal if Cr does not improve. c/w IVFs for now. Send urine lytes    3) Elevated LFTs: check hepatitis panel. [facility]ior    4) HTN: holding Toprol and HCTZ for now until patient is NPO. c/w IV hydralazine IV q 8 hours PRN with parameters    5) DM: check Hba1c. c/w HSS for now. holding PO Janumet    6) DVT ppx Subjective and Objective:   Patient is a 67y old  Male who presents with a chief complaint of abd pain, diarrhea; sbo (2017 00:53)      Initial HPI on admission:  HPI:  66 y/o male with DM, HTN, h/o liver abscess in ; had open surgery here for liver abscess with post-op wound closure secondarily  post-op had developed mult abd ventral hernias which have not given him any problem; pt wears abd binder  Pt comes with c/o diarrhea for several days with an episode of vomiting; also acknowledges flatus  no pain, f/c  CT done in ED read as consistent with a small bowel obstruction. Marked thinning of   the ventral abdominal wall musculature with diastasis of the rectus   abdominis musculature and wide neck ventral abdominal wall hernia.   Numerous dilated proximal small bowel loops within the central abdomen   which enter and exit the hernia sac. Distal small bowel loops are   decompressed at the point of entry into the right lower quadrant ventral   abdominal wall hernia which contains the cecum and terminal ileum. No   mesenteric edema or ascites. No pneumoperitoneum or pneumatosis. (2017 00:53)  Medicine Service is being Consulted for management of diabetes and HTN    PAST MEDICAL & SURGICAL HISTORY:  DM (diabetes mellitus)  Liver abscess  Liver abscess: Drainage of liver abscess    Allergies    No Known Allergies    Intolerances      FAMILY HISTORY:    Social history reviewed:     Review of Systems:  CONSTITUTIONAL: No fever, chills, or fatigue  EYES: No eye pain, visual disturbances, or discharge  ENMT:  No difficulty hearing, tinnitus, vertigo; No sinus or throat pain  NECK: No pain or stiffness  RESPIRATORY: No cough, wheezing, chills or hemoptysis; No shortness of breath  CARDIOVASCULAR: No chest pain, palpitations, dizziness, or leg swelling  GASTROINTESTINAL: abdominal pain, Nausea/vomiting and diarrhea  GENITOURINARY: No dysuria, frequency, hematuria, or incontinence  NEUROLOGICAL: No headaches, memory loss, loss of strength, numbness, or tremors  SKIN: No itching, burning, rashes, or lesions   MUSCULOSKELETAL: No joint pain or swelling; No muscle, back, or extremity pain  PSYCHIATRIC: No depression, anxiety, mood swings, or difficulty sleeping      Medications:  ondansetron Injectable 4 milliGRAM(s) IV Push every 6 hours PRN  insulin lispro (HumaLOG) corrective regimen sliding scale   SubCutaneous three times a day before meals  dextrose 5%. 1000 milliLiter(s) IV Continuous <Continuous>  dextrose Gel 1 Dose(s) Oral once PRN  dextrose 50% Injectable 12.5 Gram(s) IV Push once  dextrose 50% Injectable 25 Gram(s) IV Push once  dextrose 50% Injectable 25 Gram(s) IV Push once  glucagon  Injectable 1 milliGRAM(s) IntraMuscular once PRN  dextrose 5% + sodium chloride 0.9%. 1000 milliLiter(s) IV Continuous <Continuous>  heparin  Injectable 5000 Unit(s) SubCutaneous every 8 hours  morphine  - Injectable 2 milliGRAM(s) IV Push every 4 hours PRN        Vital Signs Last 24 Hrs  T(C): 36.6 (2017 01:33), Max: 36.8 (2017 15:30)  T(F): 97.8 (:33), Max: 98.2 (2017 15:30)  HR: 87 (:33) (83 - 101)  BP: 153/83 (2017 01:33) (140/90 - 154/97)  BP(mean): --  RR: 18 (:33) (18 - 18)  SpO2: 96% (2017 01:33) (96% - 99%)          Physical Examination:    General: No acute distress.      HEENT: Pupils equal, reactive to light.  Symmetric.    PULM: Clear to auscultation bilaterally, no significant sputum production    CVS: Regular rate and rhythm, no murmurs, rubs, or gallops    Abdomen: NGT placed by ED-draining some bilious Abd soft, midline scar well healed; upper and lower abd ventral hernias appear reducible    Ect: no edema/cyanosis      LABS:                        16.2   7.2   )-----------( 165      ( 2017 16:57 )             47.6     07-18    131<L>  |  90<L>  |  44<H>  ----------------------------<  392<H>  3.6   |  29  |  2.20<H>    Ca    9.2      2017 16:57    TPro  8.7<H>  /  Alb  4.0  /  TBili  0.7  /  DBili  x   /  AST  70<H>  /  ALT  219<H>  /  AlkPhos  80  07-18          CAPILLARY BLOOD GLUCOSE  254 (2017 23:50)          Urinalysis Basic - ( 2017 16:57 )    Color: Yellow / Appearance: Clear / S.025 / pH: x  Gluc: x / Ketone: Trace  / Bili: Negative / Urobili: Negative   Blood: x / Protein: 100 / Nitrite: Negative   Leuk Esterase: Negative / RBC: 5-10 /HPF / WBC 0-2 /HPF   Sq Epi: x / Non Sq Epi: Few / Bacteria: Moderate /HPF      CULTURES:          RADIOLOGY REVIEWED : < from: CT Abdomen and Pelvis w/ Oral Cont (17 @ 21:24) >    EXAM:  CT ABDOMEN AND PELVIS OC                            PROCEDURE DATE:  2017          INTERPRETATION:  CT OF THE ABDOMEN AND PELVIS DATED 2017.    CLINICAL INFORMATION:  Diarrhea and vomiting for 2 days, history of liver   abscess surgery in the past.    TECHNIQUE: Axial CT images are obtained from the lung bases through the   pubic symphysis without the administration of intravenous contrast. Oral   contrast is administered. Images are reformatted in the sagittal and   coronal planes.    No prior studies are available for comparison.     FINDINGS:    There is patchy groundglass opacity in the right middle lobe which may   represent developing pneumonia. There are groundglass nodules in the   right lower lobe measuring upto 4 mm.    The unenhanced liver is unremarkable. The gallbladder is normally   distended. High attenuation within the gallbladder may represent   sludge/stones or vicarious excretion of contrast from recent IV contrast   enhanced study. There is no biliary ductal dilatation. The pancreas is   mildly atrophic. The unenhanced spleen, adrenal glands, and kidneys are   unremarkable. The urinary bladder is under distended but unremarkable in   appearance. The prostate gland is enlarged.    There is marked thinning of the ventral abdominal wall musculature with   diastasis of the rectus abdominis musculature and a wide neck ventral   abdominal wall hernia. There are numerous dilated proximal small bowel   loops within the central abdomen which enter and exit the hernia sac.   Distal small bowel loops are decompressed at the point of entry into the   right lower ventral abdominal wall hernia which contains the cecum and   terminal ileum. Large bowel is relatively collapsed. There is no   mesenteric edema or ascites. There is no pneumoperitoneum or pneumatosis.   There is colonic diverticulosis without evidence of diverticulitis.    There is no significant abdominal or pelvic lymphadenopathy. There is   mild atherosclerotic calcification of the aortoiliac tree. The abdominal   aorta is normal in caliber.    There are small fat-containing bilateral inguinal hernias.    There are multilevel degenerative changes of the spine with a levoconvex   scoliosis.    IMPRESSION:    Findings consistent with a small bowel obstruction. Marked thinning of   the ventral abdominal wall musculature with diastasis of the rectus   abdominis musculature and wide neck ventral abdominal wall hernia.   Numerous dilated proximal small bowel loops within the central abdomen   which enter and exit the hernia sac. Distal small bowel loops are   decompressed at the point of entry into the right lower quadrant ventral   abdominal wall hernia which contains the cecum and terminal ileum. No   mesenteric edema orascites. No pneumoperitoneum or pneumatosis.    Patchy groundglass opacity in the right middle lobe which may represent   developing pneumonia.                    CHAD MORALES M.D.; ATTENDING RADIOLOGIST  This document has been electronically signed. 2017  9:54PM           Assessment and plan:  66 y/o male with DM, HTN, h/o liver abscess in , w/ post-op multiple ventral abdominal hernias p/w abdominal pain, diarrhea and vomiting found to have SBO    1) SBO: NPO, NGT to Sx, IVFs'. Patient is able to pass Gas. Management as per surgery    2) MIKE: unknown baseline. will hold Diuretic. f/up Urine lytes. Monitor bun/Cr. US renal if Cr does not improve. c/w IVFs for now. Send urine lytes    3) Elevated LFTs: check hepatitis panel. Montior    4) HTN: holding Toprol and HCTZ for now until patient is NPO. c/w IV hydralazine IV q 8 hours PRN with parameters    5) DM: check Hba1c. c/w HSS for now. holding PO Janumet    6) ?PNA: CT scan findings suggest Patchy groundglass opacity in the right middle lobe which may represent developing pneumonia. Patient currently denies any cough, denies fever/chills. Will monitor for now for any signs of fever or symptoms related to PNA.    6) DVT ppx Subjective and Objective:   Patient is a 67y old  Male who presents with a chief complaint of abd pain, diarrhea; sbo (2017 00:53)      Initial HPI on admission:  HPI:  68 y/o male with DM, HTN, h/o liver abscess in ; had open surgery here for liver abscess with post-op wound closure secondarily  post-op had developed mult abd ventral hernias which have not given him any problem; pt wears abd binder  Pt comes with c/o diarrhea for several days with an episode of vomiting; also acknowledges flatus  no pain, f/c  CT done in ED read as consistent with a small bowel obstruction. Marked thinning of   the ventral abdominal wall musculature with diastasis of the rectus   abdominis musculature and wide neck ventral abdominal wall hernia.   Numerous dilated proximal small bowel loops within the central abdomen   which enter and exit the hernia sac. Distal small bowel loops are   decompressed at the point of entry into the right lower quadrant ventral   abdominal wall hernia which contains the cecum and terminal ileum. No   mesenteric edema or ascites. No pneumoperitoneum or pneumatosis. (2017 00:53)  Medicine Service is being Consulted for management of diabetes and HTN    PAST MEDICAL & SURGICAL HISTORY:  DM (diabetes mellitus)  Liver abscess  Liver abscess: Drainage of liver abscess    Allergies    No Known Allergies    Intolerances      FAMILY HISTORY:    Social history reviewed:     Review of Systems:  CONSTITUTIONAL: No fever, chills, or fatigue  EYES: No eye pain, visual disturbances, or discharge  ENMT:  No difficulty hearing, tinnitus, vertigo; No sinus or throat pain  NECK: No pain or stiffness  RESPIRATORY: No cough, wheezing, chills or hemoptysis; No shortness of breath  CARDIOVASCULAR: No chest pain, palpitations, dizziness, or leg swelling  GASTROINTESTINAL: abdominal pain, Nausea/vomiting and diarrhea  GENITOURINARY: No dysuria, frequency, hematuria, or incontinence  NEUROLOGICAL: No headaches, memory loss, loss of strength, numbness, or tremors  SKIN: No itching, burning, rashes, or lesions   MUSCULOSKELETAL: No joint pain or swelling; No muscle, back, or extremity pain  PSYCHIATRIC: No depression, anxiety, mood swings, or difficulty sleeping      Medications:  ondansetron Injectable 4 milliGRAM(s) IV Push every 6 hours PRN  insulin lispro (HumaLOG) corrective regimen sliding scale   SubCutaneous three times a day before meals  dextrose 5%. 1000 milliLiter(s) IV Continuous <Continuous>  dextrose Gel 1 Dose(s) Oral once PRN  dextrose 50% Injectable 12.5 Gram(s) IV Push once  dextrose 50% Injectable 25 Gram(s) IV Push once  dextrose 50% Injectable 25 Gram(s) IV Push once  glucagon  Injectable 1 milliGRAM(s) IntraMuscular once PRN  dextrose 5% + sodium chloride 0.9%. 1000 milliLiter(s) IV Continuous <Continuous>  heparin  Injectable 5000 Unit(s) SubCutaneous every 8 hours  morphine  - Injectable 2 milliGRAM(s) IV Push every 4 hours PRN        Vital Signs Last 24 Hrs  T(C): 36.6 (2017 01:33), Max: 36.8 (2017 15:30)  T(F): 97.8 (:33), Max: 98.2 (2017 15:30)  HR: 87 (:33) (83 - 101)  BP: 153/83 (2017 01:33) (140/90 - 154/97)  BP(mean): --  RR: 18 (:33) (18 - 18)  SpO2: 96% (2017 01:33) (96% - 99%)          Physical Examination:    General: No acute distress.      HEENT: Pupils equal, reactive to light.  Symmetric.    PULM: Clear to auscultation bilaterally, no significant sputum production    CVS: Regular rate and rhythm, no murmurs, rubs, or gallops    Abdomen: NGT placed by ED-draining some bilious Abd soft, midline scar well healed; upper and lower abd ventral hernias appear reducible    Ect: no edema/cyanosis      LABS:                        16.2   7.2   )-----------( 165      ( 2017 16:57 )             47.6     07-18    131<L>  |  90<L>  |  44<H>  ----------------------------<  392<H>  3.6   |  29  |  2.20<H>    Ca    9.2      2017 16:57    TPro  8.7<H>  /  Alb  4.0  /  TBili  0.7  /  DBili  x   /  AST  70<H>  /  ALT  219<H>  /  AlkPhos  80  07-18          CAPILLARY BLOOD GLUCOSE  254 (2017 23:50)          Urinalysis Basic - ( 2017 16:57 )    Color: Yellow / Appearance: Clear / S.025 / pH: x  Gluc: x / Ketone: Trace  / Bili: Negative / Urobili: Negative   Blood: x / Protein: 100 / Nitrite: Negative   Leuk Esterase: Negative / RBC: 5-10 /HPF / WBC 0-2 /HPF   Sq Epi: x / Non Sq Epi: Few / Bacteria: Moderate /HPF      CULTURES:          RADIOLOGY REVIEWED : < from: CT Abdomen and Pelvis w/ Oral Cont (17 @ 21:24) >    EXAM:  CT ABDOMEN AND PELVIS OC                            PROCEDURE DATE:  2017          INTERPRETATION:  CT OF THE ABDOMEN AND PELVIS DATED 2017.    CLINICAL INFORMATION:  Diarrhea and vomiting for 2 days, history of liver   abscess surgery in the past.    TECHNIQUE: Axial CT images are obtained from the lung bases through the   pubic symphysis without the administration of intravenous contrast. Oral   contrast is administered. Images are reformatted in the sagittal and   coronal planes.    No prior studies are available for comparison.     FINDINGS:    There is patchy groundglass opacity in the right middle lobe which may   represent developing pneumonia. There are groundglass nodules in the   right lower lobe measuring upto 4 mm.    The unenhanced liver is unremarkable. The gallbladder is normally   distended. High attenuation within the gallbladder may represent   sludge/stones or vicarious excretion of contrast from recent IV contrast   enhanced study. There is no biliary ductal dilatation. The pancreas is   mildly atrophic. The unenhanced spleen, adrenal glands, and kidneys are   unremarkable. The urinary bladder is under distended but unremarkable in   appearance. The prostate gland is enlarged.    There is marked thinning of the ventral abdominal wall musculature with   diastasis of the rectus abdominis musculature and a wide neck ventral   abdominal wall hernia. There are numerous dilated proximal small bowel   loops within the central abdomen which enter and exit the hernia sac.   Distal small bowel loops are decompressed at the point of entry into the   right lower ventral abdominal wall hernia which contains the cecum and   terminal ileum. Large bowel is relatively collapsed. There is no   mesenteric edema or ascites. There is no pneumoperitoneum or pneumatosis.   There is colonic diverticulosis without evidence of diverticulitis.    There is no significant abdominal or pelvic lymphadenopathy. There is   mild atherosclerotic calcification of the aortoiliac tree. The abdominal   aorta is normal in caliber.    There are small fat-containing bilateral inguinal hernias.    There are multilevel degenerative changes of the spine with a levoconvex   scoliosis.    IMPRESSION:    Findings consistent with a small bowel obstruction. Marked thinning of   the ventral abdominal wall musculature with diastasis of the rectus   abdominis musculature and wide neck ventral abdominal wall hernia.   Numerous dilated proximal small bowel loops within the central abdomen   which enter and exit the hernia sac. Distal small bowel loops are   decompressed at the point of entry into the right lower quadrant ventral   abdominal wall hernia which contains the cecum and terminal ileum. No   mesenteric edema orascites. No pneumoperitoneum or pneumatosis.    Patchy groundglass opacity in the right middle lobe which may represent   developing pneumonia.                    CHAD MORALES M.D.; ATTENDING RADIOLOGIST  This document has been electronically signed. 2017  9:54PM           Assessment and plan:  68 y/o male with DM, HTN, h/o liver abscess in , w/ post-op multiple ventral abdominal hernias p/w abdominal pain, diarrhea and vomiting found to have SBO    1) SBO: NPO, NGT to Sx, IVFs'. Patient is able to pass Gas. Management as per surgery    2) MIKE: unknown baseline. will hold Diuretic. f/up Urine lytes. Monitor bun/Cr. US renal if Cr does not improve. c/w IVFs for now. Send urine lytes    3) Elevated LFTs: check hepatitis panel. [facility]ior    4) HTN: holding Toprol and HCTZ for now until patient is NPO. c/w IV hydralazine IV q 8 hours PRN with parameters    5) DM: check Hba1c. add Lantus 10U daily. c/w HSS for now. holding PO Janumet    6) ?PNA: CT scan findings suggest Patchy groundglass opacity in the right middle lobe which may represent developing pneumonia. Patient currently denies any cough, denies fever/chills. Will monitor for now for any signs of fever or symptoms related to PNA.    6) DVT ppx Subjective and Objective:   Patient is a 67y old  Male who presents with a chief complaint of abd pain, diarrhea; sbo (2017 00:53)  pt well known to me  pt called me for gi symptome/elevated sugar  was asked to come to er  seen /evaluated  found to have sbo    Initial HPI on admission:  HPI:  68 y/o male with DM, HTN, h/o liver abscess in ; had open surgery here for liver abscess with post-op wound closure secondarily  post-op had developed mult abd ventral hernias which have not given him any problem; pt wears abd binder  Pt comes with c/o diarrhea for several days with an episode of vomiting; also acknowledges flatus  no pain, f/c  CT done in ED read as consistent with a small bowel obstruction. Marked thinning of   the ventral abdominal wall musculature with diastasis of the rectus   abdominis musculature and wide neck ventral abdominal wall hernia.   Numerous dilated proximal small bowel loops within the central abdomen   which enter and exit the hernia sac. Distal small bowel loops are   decompressed at the point of entry into the right lower quadrant ventral   abdominal wall hernia which contains the cecum and terminal ileum. No   mesenteric edema or ascites. No pneumoperitoneum or pneumatosis. (2017 00:53)  Medicine Service is being Consulted for management of diabetes and HTN    PAST MEDICAL & SURGICAL HISTORY:  DM (diabetes mellitus)  Liver abscess  Liver abscess: Drainage of liver abscess    Allergies    No Known Allergies    Intolerances      FAMILY HISTORY:    Social history reviewed:     Review of Systems:  CONSTITUTIONAL: No fever, chills, or fatigue  EYES: No eye pain, visual disturbances, or discharge  ENMT:  No difficulty hearing, tinnitus, vertigo; No sinus or throat pain  NECK: No pain or stiffness  RESPIRATORY: No cough, wheezing, chills or hemoptysis; No shortness of breath  CARDIOVASCULAR: No chest pain, palpitations, dizziness, or leg swelling  GASTROINTESTINAL: abdominal pain, Nausea/vomiting and diarrhea  GENITOURINARY: No dysuria, frequency, hematuria, or incontinence  NEUROLOGICAL: No headaches, memory loss, loss of strength, numbness, or tremors  SKIN: No itching, burning, rashes, or lesions   MUSCULOSKELETAL: No joint pain or swelling; No muscle, back, or extremity pain  PSYCHIATRIC: No depression, anxiety, mood swings, or difficulty sleeping      Medications:  ondansetron Injectable 4 milliGRAM(s) IV Push every 6 hours PRN  insulin lispro (HumaLOG) corrective regimen sliding scale   SubCutaneous three times a day before meals  dextrose 5%. 1000 milliLiter(s) IV Continuous <Continuous>  dextrose Gel 1 Dose(s) Oral once PRN  dextrose 50% Injectable 12.5 Gram(s) IV Push once  dextrose 50% Injectable 25 Gram(s) IV Push once  dextrose 50% Injectable 25 Gram(s) IV Push once  glucagon  Injectable 1 milliGRAM(s) IntraMuscular once PRN  dextrose 5% + sodium chloride 0.9%. 1000 milliLiter(s) IV Continuous <Continuous>  heparin  Injectable 5000 Unit(s) SubCutaneous every 8 hours  morphine  - Injectable 2 milliGRAM(s) IV Push every 4 hours PRN        Vital Signs Last 24 Hrs  T(C): 36.6 (2017 01:33), Max: 36.8 (2017 15:30)  T(F): 97.8 (2017 01:33), Max: 98.2 (2017 15:30)  HR: 87 (2017 01:33) (83 - 101)  BP: 153/83 (2017 01:33) (140/90 - 154/97)  BP(mean): --  RR: 18 (2017 01:33) (18 - 18)  SpO2: 96% (2017 01:33) (96% - 99%)          Physical Examination:    General: No acute distress.      HEENT: Pupils equal, reactive to light.  Symmetric.    PULM: Clear to auscultation bilaterally, no significant sputum production    CVS: Regular rate and rhythm, no murmurs, rubs, or gallops    Abdomen: NGT placed by ED-draining some bilious Abd soft, midline scar well healed; upper and lower abd ventral hernias appear reducible    Ect: no edema/cyanosis      LABS:                        16.2   7.2   )-----------( 165      ( 2017 16:57 )             47.6     07-18    131<L>  |  90<L>  |  44<H>  ----------------------------<  392<H>  3.6   |  29  |  2.20<H>    Ca    9.2      2017 16:57    TPro  8.7<H>  /  Alb  4.0  /  TBili  0.7  /  DBili  x   /  AST  70<H>  /  ALT  219<H>  /  AlkPhos  80            CAPILLARY BLOOD GLUCOSE  254 (2017 23:50)          Urinalysis Basic - ( 2017 16:57 )    Color: Yellow / Appearance: Clear / S.025 / pH: x  Gluc: x / Ketone: Trace  / Bili: Negative / Urobili: Negative   Blood: x / Protein: 100 / Nitrite: Negative   Leuk Esterase: Negative / RBC: 5-10 /HPF / WBC 0-2 /HPF   Sq Epi: x / Non Sq Epi: Few / Bacteria: Moderate /HPF      CULTURES:          RADIOLOGY REVIEWED : < from: CT Abdomen and Pelvis w/ Oral Cont (17 @ 21:24) >    EXAM:  CT ABDOMEN AND PELVIS OC                            PROCEDURE DATE:  2017          INTERPRETATION:  CT OF THE ABDOMEN AND PELVIS DATED 2017.    CLINICAL INFORMATION:  Diarrhea and vomiting for 2 days, history of liver   abscess surgery in the past.    TECHNIQUE: Axial CT images are obtained from the lung bases through the   pubic symphysis without the administration of intravenous contrast. Oral   contrast is administered. Images are reformatted in the sagittal and   coronal planes.    No prior studies are available for comparison.     FINDINGS:    There is patchy groundglass opacity in the right middle lobe which may   represent developing pneumonia. There are groundglass nodules in the   right lower lobe measuring upto 4 mm.    The unenhanced liver is unremarkable. The gallbladder is normally   distended. High attenuation within the gallbladder may represent   sludge/stones or vicarious excretion of contrast from recent IV contrast   enhanced study. There is no biliary ductal dilatation. The pancreas is   mildly atrophic. The unenhanced spleen, adrenal glands, and kidneys are   unremarkable. The urinary bladder is under distended but unremarkable in   appearance. The prostate gland is enlarged.    There is marked thinning of the ventral abdominal wall musculature with   diastasis of the rectus abdominis musculature and a wide neck ventral   abdominal wall hernia. There are numerous dilated proximal small bowel   loops within the central abdomen which enter and exit the hernia sac.   Distal small bowel loops are decompressed at the point of entry into the   right lower ventral abdominal wall hernia which contains the cecum and   terminal ileum. Large bowel is relatively collapsed. There is no   mesenteric edema or ascites. There is no pneumoperitoneum or pneumatosis.   There is colonic diverticulosis without evidence of diverticulitis.    There is no significant abdominal or pelvic lymphadenopathy. There is   mild atherosclerotic calcification of the aortoiliac tree. The abdominal   aorta is normal in caliber.    There are small fat-containing bilateral inguinal hernias.    There are multilevel degenerative changes of the spine with a levoconvex   scoliosis.    IMPRESSION:    Findings consistent with a small bowel obstruction. Marked thinning of   the ventral abdominal wall musculature with diastasis of the rectus   abdominis musculature and wide neck ventral abdominal wall hernia.   Numerous dilated proximal small bowel loops within the central abdomen   which enter and exit the hernia sac. Distal small bowel loops are   decompressed at the point of entry into the right lower quadrant ventral   abdominal wall hernia which contains the cecum and terminal ileum. No   mesenteric edema orascites. No pneumoperitoneum or pneumatosis.    Patchy groundglass opacity in the right middle lobe which may represent   developing pneumonia.                    CHAD MORALES M.D.; ATTENDING RADIOLOGIST  This document has been electronically signed. 2017  9:54PM           Assessment and plan:  68 y/o male with DM, HTN, h/o liver abscess in , w/ post-op multiple ventral abdominal hernias p/w abdominal pain, diarrhea and vomiting found to have SBO    1) SBO: NPO, NGT to Sx, IVFs'. Patient is able to pass Gas. Management as per surgery    2) MIKE: unknown baseline. will hold Diuretic. f/up Urine lytes. Monitor bun/Cr. US renal if Cr does not improve. c/w IVFs for now. Send urine lytes    3) Elevated LFTs: check hepatitis panel. [facility]ior    4) HTN: holding Toprol and HCTZ for now until patient is NPO. c/w IV hydralazine IV q 8 hours PRN with parameters    5) DM: check Hba1c. add Lantus 10U daily. c/w HSS for now. holding PO Janumet    6) ?PNA: CT scan findings suggest Patchy groundglass opacity in the right middle lobe which may represent developing pneumonia. Patient currently denies any cough, denies fever/chills. Will monitor for now for any signs of fever or symptoms related to PNA.    6) DVT ppx

## 2017-07-20 LAB
ANION GAP SERPL CALC-SCNC: 9 MMOL/L — SIGNIFICANT CHANGE UP (ref 5–17)
APTT BLD: 25.9 SEC — LOW (ref 27.5–37.4)
BUN SERPL-MCNC: 12 MG/DL — SIGNIFICANT CHANGE UP (ref 7–18)
CALCIUM SERPL-MCNC: 8 MG/DL — LOW (ref 8.4–10.5)
CHLORIDE SERPL-SCNC: 109 MMOL/L — HIGH (ref 96–108)
CO2 SERPL-SCNC: 23 MMOL/L — SIGNIFICANT CHANGE UP (ref 22–31)
CREAT SERPL-MCNC: 1.03 MG/DL — SIGNIFICANT CHANGE UP (ref 0.5–1.3)
GLUCOSE SERPL-MCNC: 192 MG/DL — HIGH (ref 70–99)
HAV IGM SER-ACNC: SIGNIFICANT CHANGE UP
HBV CORE IGM SER-ACNC: SIGNIFICANT CHANGE UP
HBV SURFACE AG SER-ACNC: SIGNIFICANT CHANGE UP
HCT VFR BLD CALC: 37.9 % — LOW (ref 39–50)
HCV AB S/CO SERPL IA: 0.08 S/CO — SIGNIFICANT CHANGE UP
HCV AB SERPL-IMP: SIGNIFICANT CHANGE UP
HGB BLD-MCNC: 12.5 G/DL — LOW (ref 13–17)
INR BLD: 1.08 RATIO — SIGNIFICANT CHANGE UP (ref 0.88–1.16)
MCHC RBC-ENTMCNC: 31.7 PG — SIGNIFICANT CHANGE UP (ref 27–34)
MCHC RBC-ENTMCNC: 33 GM/DL — SIGNIFICANT CHANGE UP (ref 32–36)
MCV RBC AUTO: 96.1 FL — SIGNIFICANT CHANGE UP (ref 80–100)
PLATELET # BLD AUTO: 136 K/UL — LOW (ref 150–400)
POTASSIUM SERPL-MCNC: 3.2 MMOL/L — LOW (ref 3.5–5.3)
POTASSIUM SERPL-SCNC: 3.2 MMOL/L — LOW (ref 3.5–5.3)
PROTHROM AB SERPL-ACNC: 11.8 SEC — SIGNIFICANT CHANGE UP (ref 9.8–12.7)
RBC # BLD: 3.94 M/UL — LOW (ref 4.2–5.8)
RBC # FLD: 12.1 % — SIGNIFICANT CHANGE UP (ref 10.3–14.5)
SODIUM SERPL-SCNC: 141 MMOL/L — SIGNIFICANT CHANGE UP (ref 135–145)
WBC # BLD: 6.7 K/UL — SIGNIFICANT CHANGE UP (ref 3.8–10.5)
WBC # FLD AUTO: 6.7 K/UL — SIGNIFICANT CHANGE UP (ref 3.8–10.5)

## 2017-07-20 PROCEDURE — 99232 SBSQ HOSP IP/OBS MODERATE 35: CPT

## 2017-07-20 RX ORDER — DEXTROSE MONOHYDRATE, SODIUM CHLORIDE, AND POTASSIUM CHLORIDE 50; .745; 4.5 G/1000ML; G/1000ML; G/1000ML
1000 INJECTION, SOLUTION INTRAVENOUS
Qty: 0 | Refills: 0 | Status: DISCONTINUED | OUTPATIENT
Start: 2017-07-20 | End: 2017-07-21

## 2017-07-20 RX ORDER — POTASSIUM CHLORIDE 20 MEQ
10 PACKET (EA) ORAL
Qty: 0 | Refills: 0 | Status: COMPLETED | OUTPATIENT
Start: 2017-07-20 | End: 2017-07-20

## 2017-07-20 RX ADMIN — HEPARIN SODIUM 5000 UNIT(S): 5000 INJECTION INTRAVENOUS; SUBCUTANEOUS at 14:22

## 2017-07-20 RX ADMIN — Medication 100 MILLIEQUIVALENT(S): at 14:21

## 2017-07-20 RX ADMIN — Medication 100 MILLIEQUIVALENT(S): at 11:43

## 2017-07-20 RX ADMIN — Medication 2: at 00:19

## 2017-07-20 RX ADMIN — DEXTROSE MONOHYDRATE, SODIUM CHLORIDE, AND POTASSIUM CHLORIDE 84 MILLILITER(S): 50; .745; 4.5 INJECTION, SOLUTION INTRAVENOUS at 20:00

## 2017-07-20 RX ADMIN — Medication 2: at 11:43

## 2017-07-20 RX ADMIN — Medication 2: at 17:03

## 2017-07-20 RX ADMIN — HEPARIN SODIUM 5000 UNIT(S): 5000 INJECTION INTRAVENOUS; SUBCUTANEOUS at 06:12

## 2017-07-20 RX ADMIN — SODIUM CHLORIDE 150 MILLILITER(S): 9 INJECTION, SOLUTION INTRAVENOUS at 06:14

## 2017-07-20 RX ADMIN — INSULIN GLARGINE 10 UNIT(S): 100 INJECTION, SOLUTION SUBCUTANEOUS at 08:39

## 2017-07-20 RX ADMIN — HEPARIN SODIUM 5000 UNIT(S): 5000 INJECTION INTRAVENOUS; SUBCUTANEOUS at 22:08

## 2017-07-20 RX ADMIN — Medication 100 MILLIEQUIVALENT(S): at 10:38

## 2017-07-20 RX ADMIN — Medication 4: at 06:12

## 2017-07-20 NOTE — PROGRESS NOTE ADULT - SUBJECTIVE AND OBJECTIVE BOX
Patient with SBO that is resolving  Passing flatus and having multiple loose bowel movements  Denies abdominal pain, nausea and vomiting  NGT to LWS only putting out 550cc  NPO      Vital Signs Last 24 Hrs  T(C): 36.8 (20 Jul 2017 06:10), Max: 36.8 (20 Jul 2017 06:10)  T(F): 98.3 (20 Jul 2017 06:10), Max: 98.3 (20 Jul 2017 06:10)  HR: 64 (20 Jul 2017 06:10) (64 - 74)  BP: 133/72 (20 Jul 2017 06:10) (115/45 - 133/72)  BP(mean): --  RR: 17 (20 Jul 2017 06:10) (16 - 17)  SpO2: 99% (20 Jul 2017 06:10) (99% - 100%)                        12.5   6.7   )-----------( 136      ( 20 Jul 2017 07:38 )             37.9   07-20    141  |  109<H>  |  12  ----------------------------<  192<H>  3.2<L>   |  23  |  1.03    Ca    8.0<L>      20 Jul 2017 07:38    TPro  8.7<H>  /  Alb  4.0  /  TBili  0.7  /  DBili  x   /  AST  70<H>  /  ALT  219<H>  /  AlkPhos  80  07-18        Physical Exam  General: AAOx3, No acute distress  Skin: No jaundice, no icterus  Abdomen: soft, nontender, mildly distended, tympanic to percussion, no rebound tenderness, no guarding, no palpable masses, large midline incisional hernia, loss of domain of abdominal wall  : Normal external genitalia  Extremities: non edematous, no calf pain bilaterally

## 2017-07-20 NOTE — PROGRESS NOTE ADULT - ASSESSMENT
1.diabetes  sugars 100-225  npo  iv dextrose/kcl  lantus/humalog  2.htn  3.hyperchol  4.low k  replace

## 2017-07-20 NOTE — PROGRESS NOTE ADULT - SUBJECTIVE AND OBJECTIVE BOX
HPI:  66 y/o male with DM, HTN, h/o liver abscess in 2009; had open surgery here for liver abscess with post-op wound closure secondarily  post-op had developed mult abd ventral hernias which have not given him any problem; pt wears abd binder  Pt comes with c/o diarrhea for several days with an episode of vomiting; also acknowledges flatus  no pain, f/c  CT done in ED read as consistent with a small bowel obstruction. Marked thinning of   the ventral abdominal wall musculature with diastasis of the rectus   abdominis musculature and wide neck ventral abdominal wall hernia.   Numerous dilated proximal small bowel loops within the central abdomen   which enter and exit the hernia sac. Distal small bowel loops are   decompressed at the point of entry into the right lower quadrant ventral   abdominal wall hernia which contains the cecum and terminal ileum. No   mesenteric edema or ascites. No pneumoperitoneum or pneumatosis. (19 Jul 2017 00:53)  pt under surgical care  npo  feels better  no vomiting  passing gas/    Meds:    Allergies:  Allergies    No Known Allergies    Intolerances        REVIEW OF SYSTEMS:  passing gas/no vomiting/no cp no sob  CONSTITUTIONAL: No weakness, fevers or chills  EYES/ENT: No visual changes;  No vertigo or throat pain   NECK: No pain or stiffness  RESPIRATORY: No cough, wheezing, hemoptysis; No shortness of breath  CARDIOVASCULAR: No chest pain or palpitations  GASTROINTESTINAL: No abdominal or epigastric pain. No nausea, vomiting, or hematemesis; No diarrhea or constipation. No melena or hematochezia.  GENITOURINARY: No dysuria, frequency or hematuria  NEUROLOGICAL: No numbness or weakness  SKIN: No itching, burning, rashes, or lesions   All other review of systems is negative unless indicated above.    PHYSICAL EXAM:  awake  lungs ae fair  cardia reg  abd   Vital Signs Last 24 Hrs  T(C): 37 (20 Jul 2017 14:08), Max: 37 (20 Jul 2017 14:08)  T(F): 98.6 (20 Jul 2017 14:08), Max: 98.6 (20 Jul 2017 14:08)  HR: 75 (20 Jul 2017 14:08) (64 - 75)  BP: 128/72 (20 Jul 2017 14:08) (115/45 - 133/72)  BP(mean): --  RR: 16 (20 Jul 2017 14:08) (16 - 17)  SpO2: 98% (20 Jul 2017 14:08) (98% - 100%)    HEENT:    Neck:  [  ] Supple  [  ] Lymphadenopathy  [  ] JVD  [  ] Masses  [  ] WNL    CHEST/Respiratory: [ ] Clear to auscultation     [  ] Rales      [  ] Rhonchi      [  ] Wheezing       [  ] Chest Tenderness     [  ] WNL    Cardiovascular:  [  ]S1 S2  [  ] Reg  [  ] Irreg   [  ] No Murmurs   [  ] Murmurs  [  ] Systolic [  ] Diastolic    Gastrointestinal:  [  ] Bowel Sounds  [   ] ABD Soft  [  ] ABD Distention   [  ] No Tenderness [  ] Tenderness  [  ] Organomegaly  [  ] Guarding rigidity  [  ] No Guarding rigidity  [  ] Rebound Tenderness [  ] No Rebound Tenderness    Extremities: [  ] Edema  [  ] No Edema  [  ] Clubbing   [  ] Cyanosis  [  ] Palpable peripheral pulses                          [  ] Tender calf muscles    [  ] No Tender Calf Muscles    Neurological:  [  ] Alert  [  ] Awake  [  ] Oriented  x                              [  ] Focal weakness  [  ] No Focal Weakness    Skin:  [  ] Thrombophlebitis  [  ] Rashes  [  ] Dry  [  ] Ulcers    Ortho:  [  ] Joint Swelling  [  ] Joint erythema [  ] DJD [  ] Increased Temperature to Touch      LABS/DIAGNOSTIC TESTS                          12.5   6.7   )-----------( 136      ( 20 Jul 2017 07:38 )             37.9         07-20    141  |  109<H>  |  12  ----------------------------<  192<H>  3.2<L>   |  23  |  1.03    Ca    8.0<L>      20 Jul 2017 07:38        CAPILLARY BLOOD GLUCOSE  171 (20 Jul 2017 15:53)  200 (20 Jul 2017 11:18)  209 (20 Jul 2017 06:10)  161 (20 Jul 2017 00:17)            Hemoglobin A1C, Whole Blood: 8.1 % (07-19 @ 09:22)      CULTURES:       RADIOLOGY  CXR:    ondansetron Injectable 4 milliGRAM(s) IV Push every 6 hours PRN  dextrose 5%. 1000 milliLiter(s) IV Continuous <Continuous>  dextrose Gel 1 Dose(s) Oral once PRN  dextrose 50% Injectable 12.5 Gram(s) IV Push once  dextrose 50% Injectable 25 Gram(s) IV Push once  dextrose 50% Injectable 25 Gram(s) IV Push once  glucagon  Injectable 1 milliGRAM(s) IntraMuscular once PRN  dextrose 5% + sodium chloride 0.9%. 1000 milliLiter(s) IV Continuous <Continuous>  heparin  Injectable 5000 Unit(s) SubCutaneous every 8 hours  morphine  - Injectable 2 milliGRAM(s) IV Push every 4 hours PRN  hydrALAZINE Injectable 10 milliGRAM(s) IV Push every 8 hours PRN  insulin glargine Injectable (LANTUS) 10 Unit(s) SubCutaneous every morning  insulin lispro (HumaLOG) corrective regimen sliding scale   SubCutaneous every 6 hours

## 2017-07-20 NOTE — PROGRESS NOTE ADULT - PROBLEM SELECTOR PLAN 1
1. Keep NPO  2. potassium repletion   3. Out of bed
1. Keep NPO  2. Abdominal Series today  3. Out of bed

## 2017-07-21 ENCOUNTER — TRANSCRIPTION ENCOUNTER (OUTPATIENT)
Age: 67
End: 2017-07-21

## 2017-07-21 VITALS
SYSTOLIC BLOOD PRESSURE: 134 MMHG | HEART RATE: 56 BPM | RESPIRATION RATE: 16 BRPM | DIASTOLIC BLOOD PRESSURE: 71 MMHG | TEMPERATURE: 98 F | OXYGEN SATURATION: 98 %

## 2017-07-21 LAB
ANION GAP SERPL CALC-SCNC: 8 MMOL/L — SIGNIFICANT CHANGE UP (ref 5–17)
BASOPHILS # BLD AUTO: 0.1 K/UL — SIGNIFICANT CHANGE UP (ref 0–0.2)
BASOPHILS NFR BLD AUTO: 1 % — SIGNIFICANT CHANGE UP (ref 0–2)
BUN SERPL-MCNC: 9 MG/DL — SIGNIFICANT CHANGE UP (ref 7–18)
CALCIUM SERPL-MCNC: 8.1 MG/DL — LOW (ref 8.4–10.5)
CHLORIDE SERPL-SCNC: 108 MMOL/L — SIGNIFICANT CHANGE UP (ref 96–108)
CO2 SERPL-SCNC: 26 MMOL/L — SIGNIFICANT CHANGE UP (ref 22–31)
CREAT SERPL-MCNC: 1 MG/DL — SIGNIFICANT CHANGE UP (ref 0.5–1.3)
EOSINOPHIL # BLD AUTO: 0.3 K/UL — SIGNIFICANT CHANGE UP (ref 0–0.5)
EOSINOPHIL NFR BLD AUTO: 3.3 % — SIGNIFICANT CHANGE UP (ref 0–6)
GLUCOSE SERPL-MCNC: 142 MG/DL — HIGH (ref 70–99)
HCT VFR BLD CALC: 39.5 % — SIGNIFICANT CHANGE UP (ref 39–50)
HGB BLD-MCNC: 13.4 G/DL — SIGNIFICANT CHANGE UP (ref 13–17)
LYMPHOCYTES # BLD AUTO: 1.8 K/UL — SIGNIFICANT CHANGE UP (ref 1–3.3)
LYMPHOCYTES # BLD AUTO: 19.4 % — SIGNIFICANT CHANGE UP (ref 13–44)
MCHC RBC-ENTMCNC: 32.2 PG — SIGNIFICANT CHANGE UP (ref 27–34)
MCHC RBC-ENTMCNC: 34 GM/DL — SIGNIFICANT CHANGE UP (ref 32–36)
MCV RBC AUTO: 94.8 FL — SIGNIFICANT CHANGE UP (ref 80–100)
MONOCYTES # BLD AUTO: 1 K/UL — HIGH (ref 0–0.9)
MONOCYTES NFR BLD AUTO: 11 % — SIGNIFICANT CHANGE UP (ref 2–14)
NEUTROPHILS # BLD AUTO: 6.1 K/UL — SIGNIFICANT CHANGE UP (ref 1.8–7.4)
NEUTROPHILS NFR BLD AUTO: 65.4 % — SIGNIFICANT CHANGE UP (ref 43–77)
PLATELET # BLD AUTO: 166 K/UL — SIGNIFICANT CHANGE UP (ref 150–400)
POTASSIUM SERPL-MCNC: 3.9 MMOL/L — SIGNIFICANT CHANGE UP (ref 3.5–5.3)
POTASSIUM SERPL-SCNC: 3.9 MMOL/L — SIGNIFICANT CHANGE UP (ref 3.5–5.3)
RBC # BLD: 4.16 M/UL — LOW (ref 4.2–5.8)
RBC # FLD: 12.1 % — SIGNIFICANT CHANGE UP (ref 10.3–14.5)
SODIUM SERPL-SCNC: 142 MMOL/L — SIGNIFICANT CHANGE UP (ref 135–145)
WBC # BLD: 9.3 K/UL — SIGNIFICANT CHANGE UP (ref 3.8–10.5)
WBC # FLD AUTO: 9.3 K/UL — SIGNIFICANT CHANGE UP (ref 3.8–10.5)

## 2017-07-21 PROCEDURE — 85730 THROMBOPLASTIN TIME PARTIAL: CPT

## 2017-07-21 PROCEDURE — 82570 ASSAY OF URINE CREATININE: CPT

## 2017-07-21 PROCEDURE — 84300 ASSAY OF URINE SODIUM: CPT

## 2017-07-21 PROCEDURE — 84156 ASSAY OF PROTEIN URINE: CPT

## 2017-07-21 PROCEDURE — 99232 SBSQ HOSP IP/OBS MODERATE 35: CPT

## 2017-07-21 PROCEDURE — 74176 CT ABD & PELVIS W/O CONTRAST: CPT

## 2017-07-21 PROCEDURE — 82009 KETONE BODYS QUAL: CPT

## 2017-07-21 PROCEDURE — 83036 HEMOGLOBIN GLYCOSYLATED A1C: CPT

## 2017-07-21 PROCEDURE — 36415 COLL VENOUS BLD VENIPUNCTURE: CPT

## 2017-07-21 PROCEDURE — 83690 ASSAY OF LIPASE: CPT

## 2017-07-21 PROCEDURE — 99285 EMERGENCY DEPT VISIT HI MDM: CPT | Mod: 25

## 2017-07-21 PROCEDURE — 96375 TX/PRO/DX INJ NEW DRUG ADDON: CPT

## 2017-07-21 PROCEDURE — 80053 COMPREHEN METABOLIC PANEL: CPT

## 2017-07-21 PROCEDURE — 85027 COMPLETE CBC AUTOMATED: CPT

## 2017-07-21 PROCEDURE — 85610 PROTHROMBIN TIME: CPT

## 2017-07-21 PROCEDURE — 96374 THER/PROPH/DIAG INJ IV PUSH: CPT

## 2017-07-21 PROCEDURE — 80074 ACUTE HEPATITIS PANEL: CPT

## 2017-07-21 PROCEDURE — 74020: CPT

## 2017-07-21 PROCEDURE — 81001 URINALYSIS AUTO W/SCOPE: CPT

## 2017-07-21 PROCEDURE — 83605 ASSAY OF LACTIC ACID: CPT

## 2017-07-21 PROCEDURE — 83935 ASSAY OF URINE OSMOLALITY: CPT

## 2017-07-21 PROCEDURE — 80048 BASIC METABOLIC PNL TOTAL CA: CPT

## 2017-07-21 RX ORDER — METOPROLOL TARTRATE 50 MG
25 TABLET ORAL DAILY
Qty: 0 | Refills: 0 | Status: DISCONTINUED | OUTPATIENT
Start: 2017-07-21 | End: 2017-07-21

## 2017-07-21 RX ORDER — TAMSULOSIN HYDROCHLORIDE 0.4 MG/1
0.4 CAPSULE ORAL AT BEDTIME
Qty: 0 | Refills: 0 | Status: DISCONTINUED | OUTPATIENT
Start: 2017-07-21 | End: 2017-07-21

## 2017-07-21 RX ORDER — FINASTERIDE 5 MG/1
5 TABLET, FILM COATED ORAL DAILY
Qty: 0 | Refills: 0 | Status: DISCONTINUED | OUTPATIENT
Start: 2017-07-21 | End: 2017-07-21

## 2017-07-21 RX ORDER — HYDROCHLOROTHIAZIDE 25 MG
12.5 TABLET ORAL DAILY
Qty: 0 | Refills: 0 | Status: DISCONTINUED | OUTPATIENT
Start: 2017-07-21 | End: 2017-07-21

## 2017-07-21 RX ADMIN — Medication 25 MILLIGRAM(S): at 09:51

## 2017-07-21 RX ADMIN — Medication 4: at 11:29

## 2017-07-21 RX ADMIN — Medication 12.5 MILLIGRAM(S): at 09:51

## 2017-07-21 RX ADMIN — INSULIN GLARGINE 10 UNIT(S): 100 INJECTION, SOLUTION SUBCUTANEOUS at 09:00

## 2017-07-21 RX ADMIN — DEXTROSE MONOHYDRATE, SODIUM CHLORIDE, AND POTASSIUM CHLORIDE 84 MILLILITER(S): 50; .745; 4.5 INJECTION, SOLUTION INTRAVENOUS at 05:26

## 2017-07-21 RX ADMIN — HEPARIN SODIUM 5000 UNIT(S): 5000 INJECTION INTRAVENOUS; SUBCUTANEOUS at 05:27

## 2017-07-21 RX ADMIN — FINASTERIDE 5 MILLIGRAM(S): 5 TABLET, FILM COATED ORAL at 11:29

## 2017-07-21 NOTE — DISCHARGE NOTE ADULT - OTHER SIGNIFICANT FINDINGS
Patient ID: [id number] Patient Name: NOEL FARFAN   YOB: 1950 Sex: M        EXAM: CT ABDOMEN AND PELVIS OC      PROCEDURE DATE: 07/18/2017        INTERPRETATION: CT OF THE ABDOMEN AND PELVIS DATED July 18, 2017.    CLINICAL INFORMATION: Diarrhea and vomiting for 2 days, history of liver  abscess surgery in the past.    TECHNIQUE: Axial CT images are obtained from the lung bases through the  pubic symphysis without the administration of intravenous contrast. Oral  contrast is administered. Images are reformatted in the sagittal and coronal  planes.    No prior studies are available for comparison.    FINDINGS:    There is patchy groundglass opacity in the right middle lobe which may  represent developing pneumonia. There are groundglass nodules in the right  lower lobe measuring up to 4 mm.    The unenhanced liver is unremarkable. The gallbladder is normally distended.  High attenuation within the gallbladder may represent sludge/stones or  vicarious excretion of contrast from recent IV contrast enhanced study.  There is no biliary ductal dilatation. The pancreas is mildly atrophic. The  unenhanced spleen, adrenal glands, and kidneys are unremarkable. The urinary  bladder is under distended but unremarkable in appearance. The prostate  gland is enlarged.    There is marked thinning of the ventral abdominal wall musculature with  diastasis of the rectus abdominis musculature and a wide neck ventral  abdominal wall hernia. There are numerous dilated proximal small bowel loops  within the central abdomen which enter and exit the hernia sac. Distal small  bowel loops are decompressed at the point of entry into the right lower  ventral abdominal wall hernia which contains the cecum and terminal ileum.  Large bowel is relatively collapsed. There is no mesenteric edema or  ascites. There is no pneumoperitoneum or pneumatosis. There is colonic  diverticulosis without evidence of diverticulitis.    There is no significant abdominal or pelvic lymphadenopathy. There is mild  atherosclerotic calcification of the aortoiliac tree. The abdominal aorta is  normal in caliber.    There are small fat-containing bilateral inguinal hernias.    There are multilevel degenerative changes of the spine with a levoconvex  scoliosis.    IMPRESSION:    Findings consistent with a small bowel obstruction. Marked thinning of the  ventral abdominal wall musculature with diastasis of the rectus abdominis  musculature and wide neck ventral abdominal wall hernia. Numerous dilated  proximal small bowel loops within the central abdomen which enter and exit  the hernia sac. Distal small bowel loops are decompressed at the point of  entry into the right lower quadrant ventral abdominal wall hernia which  contains the cecum and terminal ileum. No mesenteric edema or ascites. No  pneumoperitoneum or pneumatosis.    Patchy groundglass opacity in the right middle lobe which may represent  developing pneumonia.                    CHAD MORALES M.D.; ATTENDING RADIOLOGIST  This document has been electronically signed. Jul 18 2017 9:54PM

## 2017-07-21 NOTE — DISCHARGE NOTE ADULT - HOSPITAL COURSE
66 y/o male with DM, HTN, h/o liver abscess in 2009; had open surgery here for liver abscess with post-op wound closure secondarily  post-op had developed mult abd ventral hernias which have not given him any problem; pt wears abd binder  Pt comes with c/o diarrhea for several days with an episode of vomiting; also acknowledges flatus  no pain, f/c  CT done in ED read as consistent with a small bowel obstruction. Marked thinning of   the ventral abdominal wall musculature with diastasis of the rectus   abdominis musculature and wide neck ventral abdominal wall hernia.   Numerous dilated proximal small bowel loops within the central abdomen   which enter and exit the hernia sac. Distal small bowel loops are   decompressed at the point of entry into the right lower quadrant ventral   abdominal wall hernia which contains the cecum and terminal ileum. No   mesenteric edema or ascites. No pneumoperitoneum or pneumatosis.    Patient was admitted with SBO, NGT was placed. Patient clinically improved. Patients NGT was removed, diet was advanced and tolerated. Patient for discharge home with follow up with Dr. Schafer.

## 2017-07-21 NOTE — DISCHARGE NOTE ADULT - PLAN OF CARE
Now resolved, diet as tolerated follow up with Dr. Wen within 1 week   Diet and activity as tolerated

## 2017-07-21 NOTE — DISCHARGE NOTE ADULT - MEDICATION SUMMARY - MEDICATIONS TO TAKE
I will START or STAY ON the medications listed below when I get home from the hospital:    Janumet 50 mg-1000 mg oral tablet  -- 1 tab(s) by mouth 2 times a day  -- Indication: For DMII    metoprolol succinate 25 mg oral tablet, extended release  -- 1 tab(s) by mouth once a day  -- Indication: For htn     hydroCHLOROthiazide 12.5 mg oral capsule  -- 1 cap(s) by mouth once a day  -- Indication: For htn

## 2017-07-21 NOTE — DISCHARGE NOTE ADULT - CARE PLAN
Principal Discharge DX:	Small bowel obstruction  Goal:	Now resolved, diet as tolerated  Instructions for follow-up, activity and diet:	follow up with Dr. Wen within 1 week   Diet and activity as tolerated

## 2017-07-21 NOTE — PROGRESS NOTE ADULT - SUBJECTIVE AND OBJECTIVE BOX
INTERVAL HPI/OVERNIGHT EVENTS:  Pt stable.   Tolerating diet.   flatus/BM.  No pain    Vital Signs Last 24 Hrs  T(C): 36.8 (21 Jul 2017 13:53), Max: 36.8 (20 Jul 2017 21:45)  T(F): 98.3 (21 Jul 2017 13:53), Max: 98.3 (20 Jul 2017 21:45)  HR: 56 (21 Jul 2017 13:53) (53 - 74)  BP: 134/71 (21 Jul 2017 13:53) (129/73 - 140/89)  BP(mean): --  RR: 16 (21 Jul 2017 13:53) (16 - 17)  SpO2: 98% (21 Jul 2017 13:53) (98% - 100%)    Physical:  Abdomen: Soft nondistended, nontender.  No complaints    I&O's Summary      LABS:                        13.4   9.3   )-----------( 166      ( 21 Jul 2017 05:47 )             39.5             07-21    142  |  108  |  9   ----------------------------<  142<H>  3.9   |  26  |  1.00    Ca    8.1<L>      21 Jul 2017 05:47

## 2017-07-21 NOTE — DISCHARGE NOTE ADULT - NS AS ACTIVITY OBS
Walking-Indoors allowed/No Heavy lifting/straining/Walking-Outdoors allowed/Return to Work/School allowed

## 2017-07-21 NOTE — DISCHARGE NOTE ADULT - PATIENT PORTAL LINK FT
“You can access the [employer] Patient Portal, offered by [facility], by registering with the following website: [email or web address]”

## 2017-07-21 NOTE — DISCHARGE NOTE ADULT - CARE PROVIDER_API CALL
Shakeel Wen), Surgery  [id number] [address] [place] [address]  Phone: [phone]  Fax: [phone]

## 2017-07-25 DIAGNOSIS — K43.9 VENTRAL HERNIA WITHOUT OBSTRUCTION OR GANGRENE: ICD-10-CM

## 2017-07-25 DIAGNOSIS — E11.65 TYPE 2 DIABETES MELLITUS WITH HYPERGLYCEMIA: ICD-10-CM

## 2017-07-25 DIAGNOSIS — N17.9 ACUTE KIDNEY FAILURE, UNSPECIFIED: ICD-10-CM

## 2017-07-25 DIAGNOSIS — E86.0 DEHYDRATION: ICD-10-CM

## 2017-07-25 DIAGNOSIS — K56.69 OTHER INTESTINAL OBSTRUCTION: ICD-10-CM

## 2017-07-25 DIAGNOSIS — E87.6 HYPOKALEMIA: ICD-10-CM

## 2017-07-25 DIAGNOSIS — E78.5 HYPERLIPIDEMIA, UNSPECIFIED: ICD-10-CM

## 2017-07-25 DIAGNOSIS — I10 ESSENTIAL (PRIMARY) HYPERTENSION: ICD-10-CM

## 2017-07-25 DIAGNOSIS — R79.89 OTHER SPECIFIED ABNORMAL FINDINGS OF BLOOD CHEMISTRY: ICD-10-CM

## 2017-08-14 ENCOUNTER — MEDICATION RENEWAL (OUTPATIENT)
Age: 67
End: 2017-08-14

## 2017-08-14 LAB
ALBUMIN SERPL ELPH-MCNC: 4.1 G/DL
ALP BLD-CCNC: 59 U/L
ALT SERPL-CCNC: 14 U/L
ANION GAP SERPL CALC-SCNC: 17 MMOL/L
AST SERPL-CCNC: 14 U/L
BILIRUB SERPL-MCNC: 0.3 MG/DL
BUN SERPL-MCNC: 15 MG/DL
CALCIUM SERPL-MCNC: 8.7 MG/DL
CHLORIDE SERPL-SCNC: 100 MMOL/L
CO2 SERPL-SCNC: 21 MMOL/L
CREAT SERPL-MCNC: 1.1 MG/DL
GLUCOSE SERPL-MCNC: 131 MG/DL
HBA1C MFR BLD HPLC: 7.8 %
POTASSIUM SERPL-SCNC: 4.5 MMOL/L
PROT SERPL-MCNC: 6.3 G/DL
SODIUM SERPL-SCNC: 138 MMOL/L
TSH SERPL-ACNC: 2.24 UIU/ML

## 2017-08-21 ENCOUNTER — APPOINTMENT (OUTPATIENT)
Dept: ENDOCRINOLOGY | Facility: CLINIC | Age: 67
End: 2017-08-21
Payer: COMMERCIAL

## 2017-08-21 VITALS
OXYGEN SATURATION: 99 % | SYSTOLIC BLOOD PRESSURE: 120 MMHG | BODY MASS INDEX: 27.62 KG/M2 | WEIGHT: 176 LBS | HEART RATE: 77 BPM | RESPIRATION RATE: 16 BRPM | TEMPERATURE: 97.7 F | HEIGHT: 67 IN | DIASTOLIC BLOOD PRESSURE: 85 MMHG

## 2017-08-21 PROCEDURE — 99214 OFFICE O/P EST MOD 30 MIN: CPT

## 2017-09-18 ENCOUNTER — MEDICATION RENEWAL (OUTPATIENT)
Age: 67
End: 2017-09-18

## 2017-11-13 ENCOUNTER — MEDICATION RENEWAL (OUTPATIENT)
Age: 67
End: 2017-11-13

## 2017-11-17 ENCOUNTER — MEDICATION RENEWAL (OUTPATIENT)
Age: 67
End: 2017-11-17

## 2017-11-17 ENCOUNTER — APPOINTMENT (OUTPATIENT)
Dept: ENDOCRINOLOGY | Facility: CLINIC | Age: 67
End: 2017-11-17
Payer: COMMERCIAL

## 2017-11-17 LAB — GLUCOSE BLDC GLUCOMTR-MCNC: 115

## 2017-11-17 PROCEDURE — 99214 OFFICE O/P EST MOD 30 MIN: CPT

## 2017-11-17 PROCEDURE — 82962 GLUCOSE BLOOD TEST: CPT

## 2017-11-21 ENCOUNTER — RX RENEWAL (OUTPATIENT)
Age: 67
End: 2017-11-21

## 2018-01-05 ENCOUNTER — RX RENEWAL (OUTPATIENT)
Age: 68
End: 2018-01-05

## 2018-02-08 ENCOUNTER — APPOINTMENT (OUTPATIENT)
Dept: ENDOCRINOLOGY | Facility: CLINIC | Age: 68
End: 2018-02-08
Payer: COMMERCIAL

## 2018-02-08 VITALS
DIASTOLIC BLOOD PRESSURE: 80 MMHG | BODY MASS INDEX: 28.56 KG/M2 | HEIGHT: 67 IN | WEIGHT: 182 LBS | SYSTOLIC BLOOD PRESSURE: 120 MMHG

## 2018-02-08 PROCEDURE — 99214 OFFICE O/P EST MOD 30 MIN: CPT

## 2018-02-23 ENCOUNTER — MEDICATION RENEWAL (OUTPATIENT)
Age: 68
End: 2018-02-23

## 2018-03-06 ENCOUNTER — RX RENEWAL (OUTPATIENT)
Age: 68
End: 2018-03-06

## 2018-03-08 ENCOUNTER — MEDICATION RENEWAL (OUTPATIENT)
Age: 68
End: 2018-03-08

## 2018-03-12 ENCOUNTER — RX RENEWAL (OUTPATIENT)
Age: 68
End: 2018-03-12

## 2018-03-12 RX ORDER — SITAGLIPTIN AND METFORMIN HYDROCHLORIDE 50; 1000 MG/1; MG/1
50-1000 TABLET, FILM COATED, EXTENDED RELEASE ORAL
Qty: 120 | Refills: 0 | Status: DISCONTINUED | COMMUNITY
Start: 2017-03-16 | End: 2018-03-12

## 2018-03-12 RX ORDER — SITAGLIPTIN AND METFORMIN HYDROCHLORIDE 50; 1000 MG/1; MG/1
50-1000 TABLET, FILM COATED, EXTENDED RELEASE ORAL
Qty: 180 | Refills: 0 | Status: DISCONTINUED | COMMUNITY
Start: 2017-03-09 | End: 2018-03-12

## 2018-03-21 ENCOUNTER — MEDICATION RENEWAL (OUTPATIENT)
Age: 68
End: 2018-03-21

## 2018-03-21 ENCOUNTER — OTHER (OUTPATIENT)
Age: 68
End: 2018-03-21

## 2018-05-10 ENCOUNTER — APPOINTMENT (OUTPATIENT)
Dept: ENDOCRINOLOGY | Facility: CLINIC | Age: 68
End: 2018-05-10
Payer: COMMERCIAL

## 2018-05-10 VITALS
WEIGHT: 178 LBS | HEART RATE: 61 BPM | SYSTOLIC BLOOD PRESSURE: 128 MMHG | OXYGEN SATURATION: 99 % | TEMPERATURE: 97.8 F | DIASTOLIC BLOOD PRESSURE: 79 MMHG | BODY MASS INDEX: 27.94 KG/M2 | RESPIRATION RATE: 16 BRPM | HEIGHT: 67 IN

## 2018-05-10 PROCEDURE — 99214 OFFICE O/P EST MOD 30 MIN: CPT

## 2018-05-24 ENCOUNTER — MEDICATION RENEWAL (OUTPATIENT)
Age: 68
End: 2018-05-24

## 2018-08-28 ENCOUNTER — APPOINTMENT (OUTPATIENT)
Dept: ENDOCRINOLOGY | Facility: CLINIC | Age: 68
End: 2018-08-28

## 2018-08-28 PROBLEM — K75.0 ABSCESS OF LIVER: Chronic | Status: ACTIVE | Noted: 2017-07-19

## 2018-08-28 PROBLEM — E11.9 TYPE 2 DIABETES MELLITUS WITHOUT COMPLICATIONS: Chronic | Status: ACTIVE | Noted: 2017-07-19

## 2018-08-30 ENCOUNTER — RX RENEWAL (OUTPATIENT)
Age: 68
End: 2018-08-30

## 2018-09-18 ENCOUNTER — RX RENEWAL (OUTPATIENT)
Age: 68
End: 2018-09-18

## 2018-10-25 ENCOUNTER — APPOINTMENT (OUTPATIENT)
Dept: ENDOCRINOLOGY | Facility: CLINIC | Age: 68
End: 2018-10-25
Payer: COMMERCIAL

## 2018-10-25 VITALS
SYSTOLIC BLOOD PRESSURE: 114 MMHG | DIASTOLIC BLOOD PRESSURE: 80 MMHG | RESPIRATION RATE: 16 BRPM | BODY MASS INDEX: 27.47 KG/M2 | HEIGHT: 67 IN | TEMPERATURE: 97.6 F | WEIGHT: 175 LBS | OXYGEN SATURATION: 98 % | HEART RATE: 64 BPM

## 2018-10-25 DIAGNOSIS — Z86.39 PERSONAL HISTORY OF OTHER ENDOCRINE, NUTRITIONAL AND METABOLIC DISEASE: ICD-10-CM

## 2018-10-25 LAB — GLUCOSE BLDC GLUCOMTR-MCNC: 118

## 2018-10-25 PROCEDURE — 99204 OFFICE O/P NEW MOD 45 MIN: CPT | Mod: 25

## 2018-10-25 PROCEDURE — 82962 GLUCOSE BLOOD TEST: CPT

## 2018-10-25 RX ORDER — ATORVASTATIN CALCIUM 40 MG/1
40 TABLET, FILM COATED ORAL
Qty: 1 | Refills: 2 | Status: ACTIVE | COMMUNITY
Start: 2018-10-25 | End: 1900-01-01

## 2018-10-25 RX ORDER — GLIMEPIRIDE 1 MG/1
1 TABLET ORAL DAILY
Qty: 30 | Refills: 1 | Status: COMPLETED | COMMUNITY
Start: 2017-08-21 | End: 2018-10-25

## 2019-01-23 ENCOUNTER — INPATIENT (INPATIENT)
Facility: HOSPITAL | Age: 69
LOS: 1 days | Discharge: HOME | DRG: 395 | End: 2019-01-25
Attending: SPECIALIST | Admitting: SPECIALIST
Payer: COMMERCIAL

## 2019-01-23 VITALS
SYSTOLIC BLOOD PRESSURE: 123 MMHG | OXYGEN SATURATION: 99 % | TEMPERATURE: 97 F | DIASTOLIC BLOOD PRESSURE: 83 MMHG | RESPIRATION RATE: 17 BRPM | HEART RATE: 73 BPM

## 2019-01-23 DIAGNOSIS — K56.609 UNSPECIFIED INTESTINAL OBSTRUCTION, UNSPECIFIED AS TO PARTIAL VERSUS COMPLETE OBSTRUCTION: ICD-10-CM

## 2019-01-23 DIAGNOSIS — K75.0 ABSCESS OF LIVER: Chronic | ICD-10-CM

## 2019-01-23 LAB
ALBUMIN SERPL ELPH-MCNC: 3.8 G/DL — SIGNIFICANT CHANGE UP (ref 3.5–5)
ALP SERPL-CCNC: 73 U/L — SIGNIFICANT CHANGE UP (ref 40–120)
ALT FLD-CCNC: 25 U/L DA — SIGNIFICANT CHANGE UP (ref 10–60)
ANION GAP SERPL CALC-SCNC: 12 MMOL/L — SIGNIFICANT CHANGE UP (ref 5–17)
APPEARANCE UR: CLEAR — SIGNIFICANT CHANGE UP
APTT BLD: 26 SEC — LOW (ref 27.5–36.3)
AST SERPL-CCNC: 18 U/L — SIGNIFICANT CHANGE UP (ref 10–40)
BASOPHILS # BLD AUTO: 0.1 K/UL — SIGNIFICANT CHANGE UP (ref 0–0.2)
BASOPHILS NFR BLD AUTO: 0.5 % — SIGNIFICANT CHANGE UP (ref 0–2)
BILIRUB SERPL-MCNC: 0.9 MG/DL — SIGNIFICANT CHANGE UP (ref 0.2–1.2)
BILIRUB UR-MCNC: ABNORMAL
BUN SERPL-MCNC: 22 MG/DL — HIGH (ref 7–18)
CALCIUM SERPL-MCNC: 8.9 MG/DL — SIGNIFICANT CHANGE UP (ref 8.4–10.5)
CHLORIDE SERPL-SCNC: 100 MMOL/L — SIGNIFICANT CHANGE UP (ref 96–108)
CO2 SERPL-SCNC: 25 MMOL/L — SIGNIFICANT CHANGE UP (ref 22–31)
COLOR SPEC: YELLOW — SIGNIFICANT CHANGE UP
CREAT SERPL-MCNC: 1.32 MG/DL — HIGH (ref 0.5–1.3)
DIFF PNL FLD: ABNORMAL
EOSINOPHIL # BLD AUTO: 0 K/UL — SIGNIFICANT CHANGE UP (ref 0–0.5)
EOSINOPHIL NFR BLD AUTO: 0.1 % — SIGNIFICANT CHANGE UP (ref 0–6)
GLUCOSE BLDC GLUCOMTR-MCNC: 162 MG/DL — HIGH (ref 70–99)
GLUCOSE SERPL-MCNC: 188 MG/DL — HIGH (ref 70–99)
GLUCOSE UR QL: NEGATIVE — SIGNIFICANT CHANGE UP
HCT VFR BLD CALC: 44.4 % — SIGNIFICANT CHANGE UP (ref 39–50)
HGB BLD-MCNC: 14.8 G/DL — SIGNIFICANT CHANGE UP (ref 13–17)
INR BLD: 1.1 RATIO — SIGNIFICANT CHANGE UP (ref 0.88–1.16)
KETONES UR-MCNC: ABNORMAL
LEUKOCYTE ESTERASE UR-ACNC: ABNORMAL
LIDOCAIN IGE QN: 99 U/L — SIGNIFICANT CHANGE UP (ref 73–393)
LYMPHOCYTES # BLD AUTO: 1 K/UL — SIGNIFICANT CHANGE UP (ref 1–3.3)
LYMPHOCYTES # BLD AUTO: 6.6 % — LOW (ref 13–44)
MCHC RBC-ENTMCNC: 31.8 PG — SIGNIFICANT CHANGE UP (ref 27–34)
MCHC RBC-ENTMCNC: 33.4 GM/DL — SIGNIFICANT CHANGE UP (ref 32–36)
MCV RBC AUTO: 95.2 FL — SIGNIFICANT CHANGE UP (ref 80–100)
MONOCYTES # BLD AUTO: 1 K/UL — HIGH (ref 0–0.9)
MONOCYTES NFR BLD AUTO: 6.3 % — SIGNIFICANT CHANGE UP (ref 2–14)
NEUTROPHILS # BLD AUTO: 13.6 K/UL — HIGH (ref 1.8–7.4)
NEUTROPHILS NFR BLD AUTO: 86.4 % — HIGH (ref 43–77)
NITRITE UR-MCNC: NEGATIVE — SIGNIFICANT CHANGE UP
PH UR: 5 — SIGNIFICANT CHANGE UP (ref 5–8)
PLATELET # BLD AUTO: 212 K/UL — SIGNIFICANT CHANGE UP (ref 150–400)
POTASSIUM SERPL-MCNC: 4.5 MMOL/L — SIGNIFICANT CHANGE UP (ref 3.5–5.3)
POTASSIUM SERPL-SCNC: 4.5 MMOL/L — SIGNIFICANT CHANGE UP (ref 3.5–5.3)
PROT SERPL-MCNC: 7.2 G/DL — SIGNIFICANT CHANGE UP (ref 6–8.3)
PROT UR-MCNC: 30 MG/DL
PROTHROM AB SERPL-ACNC: 12.3 SEC — SIGNIFICANT CHANGE UP (ref 10–12.9)
RBC # BLD: 4.67 M/UL — SIGNIFICANT CHANGE UP (ref 4.2–5.8)
RBC # FLD: 12.1 % — SIGNIFICANT CHANGE UP (ref 10.3–14.5)
SODIUM SERPL-SCNC: 137 MMOL/L — SIGNIFICANT CHANGE UP (ref 135–145)
SP GR SPEC: 1.02 — SIGNIFICANT CHANGE UP (ref 1.01–1.02)
TROPONIN I SERPL-MCNC: <0.015 NG/ML — SIGNIFICANT CHANGE UP (ref 0–0.04)
UROBILINOGEN FLD QL: 1
WBC # BLD: 15.7 K/UL — HIGH (ref 3.8–10.5)
WBC # FLD AUTO: 15.7 K/UL — HIGH (ref 3.8–10.5)

## 2019-01-23 PROCEDURE — 99223 1ST HOSP IP/OBS HIGH 75: CPT

## 2019-01-23 PROCEDURE — 74177 CT ABD & PELVIS W/CONTRAST: CPT | Mod: 26

## 2019-01-23 PROCEDURE — 99285 EMERGENCY DEPT VISIT HI MDM: CPT

## 2019-01-23 RX ORDER — SODIUM CHLORIDE 9 MG/ML
1000 INJECTION, SOLUTION INTRAVENOUS
Qty: 0 | Refills: 0 | Status: DISCONTINUED | OUTPATIENT
Start: 2019-01-23 | End: 2019-01-25

## 2019-01-23 RX ORDER — IOHEXOL 300 MG/ML
30 INJECTION, SOLUTION INTRAVENOUS ONCE
Qty: 0 | Refills: 0 | Status: COMPLETED | OUTPATIENT
Start: 2019-01-23 | End: 2019-01-23

## 2019-01-23 RX ORDER — HYDROCHLOROTHIAZIDE 25 MG
12.5 TABLET ORAL DAILY
Qty: 0 | Refills: 0 | Status: DISCONTINUED | OUTPATIENT
Start: 2019-01-23 | End: 2019-01-25

## 2019-01-23 RX ORDER — INSULIN LISPRO 100/ML
VIAL (ML) SUBCUTANEOUS
Qty: 0 | Refills: 0 | Status: DISCONTINUED | OUTPATIENT
Start: 2019-01-23 | End: 2019-01-25

## 2019-01-23 RX ORDER — HEPARIN SODIUM 5000 [USP'U]/ML
5000 INJECTION INTRAVENOUS; SUBCUTANEOUS EVERY 8 HOURS
Qty: 0 | Refills: 0 | Status: DISCONTINUED | OUTPATIENT
Start: 2019-01-23 | End: 2019-01-25

## 2019-01-23 RX ORDER — METOPROLOL TARTRATE 50 MG
25 TABLET ORAL DAILY
Qty: 0 | Refills: 0 | Status: DISCONTINUED | OUTPATIENT
Start: 2019-01-23 | End: 2019-01-25

## 2019-01-23 RX ORDER — DEXTROSE 50 % IN WATER 50 %
25 SYRINGE (ML) INTRAVENOUS ONCE
Qty: 0 | Refills: 0 | Status: DISCONTINUED | OUTPATIENT
Start: 2019-01-23 | End: 2019-01-25

## 2019-01-23 RX ORDER — DEXTROSE 50 % IN WATER 50 %
15 SYRINGE (ML) INTRAVENOUS ONCE
Qty: 0 | Refills: 0 | Status: DISCONTINUED | OUTPATIENT
Start: 2019-01-23 | End: 2019-01-25

## 2019-01-23 RX ORDER — GLUCAGON INJECTION, SOLUTION 0.5 MG/.1ML
1 INJECTION, SOLUTION SUBCUTANEOUS ONCE
Qty: 0 | Refills: 0 | Status: DISCONTINUED | OUTPATIENT
Start: 2019-01-23 | End: 2019-01-25

## 2019-01-23 RX ORDER — DEXTROSE 50 % IN WATER 50 %
12.5 SYRINGE (ML) INTRAVENOUS ONCE
Qty: 0 | Refills: 0 | Status: DISCONTINUED | OUTPATIENT
Start: 2019-01-23 | End: 2019-01-25

## 2019-01-23 RX ORDER — ONDANSETRON 8 MG/1
4 TABLET, FILM COATED ORAL EVERY 6 HOURS
Qty: 0 | Refills: 0 | Status: DISCONTINUED | OUTPATIENT
Start: 2019-01-23 | End: 2019-01-25

## 2019-01-23 RX ADMIN — IOHEXOL 30 MILLILITER(S): 300 INJECTION, SOLUTION INTRAVENOUS at 16:17

## 2019-01-23 RX ADMIN — HEPARIN SODIUM 5000 UNIT(S): 5000 INJECTION INTRAVENOUS; SUBCUTANEOUS at 21:47

## 2019-01-23 RX ADMIN — SODIUM CHLORIDE 125 MILLILITER(S): 9 INJECTION, SOLUTION INTRAVENOUS at 21:45

## 2019-01-23 NOTE — ED PROVIDER NOTE - OBJECTIVE STATEMENT
67 y/o M with PMHx of DM, HLD, HTN, abdominal cyst s/p surgery years ago presents to the ED with complaints of abdominal pain x 3 days. Patient describes pain as intermittent cramping pain, beginning in the lower abdomen migrating to the upper abdomen. Pain worsened last night, however states is improving today and is associated with decreased PO intake. Last bowel movement was last night. Denies fever, nausea, vomiting, diarrhea, recent travel, recent antibiotic use or any other acute complaints. NKDA.

## 2019-01-23 NOTE — ED PROVIDER NOTE - ABDOMINAL EXAM
no guarding, no rebound. Large midline incisional hernia, easily reducible./HERNIATION/tender.../nondistended/soft

## 2019-01-23 NOTE — H&P ADULT - NSHPPHYSICALEXAM_GEN_ALL_CORE
NAD  alert, oriented x3  S1S2  abd very soft, NT/ND, multiple chronic ventral hernias, lower midline hernia reduced, mid abd well healed wide scar  ext no c/c/e

## 2019-01-23 NOTE — ED PROVIDER NOTE - CARDIAC, MLM
Normal rate, regular rhythm.  Heart sounds S1, S2.  No murmurs, rubs or gallops. Trace pitting edema bilaterally.

## 2019-01-23 NOTE — ED PROVIDER NOTE - ATTENDING CONTRIBUTION TO CARE
I conducted a face-to-face interaction with patient and I agree with resident documentation and plan.  Pt presents with abdominal pain with h/o ventral hernia and prior SBO  Exam: ventral hernia reducible, mild abdominal tenderness; no guarding  A/P:  CT and labs; CT showing partial SBO--surgery consulted

## 2019-01-23 NOTE — ED ADULT NURSE NOTE - OBJECTIVE STATEMENT
pt is here for abdominal pain.  pt stated that pain starting 2 days, c/o sweating, hiccups, pain comes and goes, denied fever or chills, denied N/V/D or sob, pt calm at this time.

## 2019-01-23 NOTE — ED PROVIDER NOTE - MEDICAL DECISION MAKING DETAILS
69 y/o M presents with abdominal pain with surgical history. Will rule out SBO versus colitis versus other intraabdominal infection with CT abdomen/pelvis. Check labs and reassess. Patient is very well appearing. There is no concern for cardiac or vascular emergency.

## 2019-01-23 NOTE — H&P ADULT - HISTORY OF PRESENT ILLNESS
67 y/o male with DM, HTN, h/o liver abscess in 2009; had open surgery here for liver abscess with post-op wound closure secondarily  has multiple chronic ventral hernias with admission in 2017 for psbo that resolved with conservative management  comes with c/o several days of lower abd cramping; +bm/flatus  no f/c/n/v    < from: CT Abdomen and Pelvis w/ Oral Cont and w/ IV Cont (01.23.19 @ 18:32) >  IMPRESSION: Partial small bowel obstruction. This appears to be secondary   to a ventral hernia in the lower anterior abdominal wall in the midline.   There is an additional wide mouth superior nonobstructing ventral hernia   and an additional more lateral right nonobstructing small bowel   containing ventral hernia. An additional left inferior pelvic ventral   hernia contains distended bowel loops without a transition.    < end of copied text >

## 2019-01-23 NOTE — ED PROVIDER NOTE - PMH
Abdominal cyst    DM (diabetes mellitus)    HLD (hyperlipidemia)    HTN (hypertension)    Liver abscess

## 2019-01-23 NOTE — ED ADULT NURSE NOTE - NSIMPLEMENTINTERV_GEN_ALL_ED
Implemented All Universal Safety Interventions:  [place] to call system. Call bell, personal items and telephone within reach. Instruct patient to call for assistance. Room bathroom lighting operational. Non-slip footwear when patient is off stretcher. Physically safe environment: no spills, clutter or unnecessary equipment. Stretcher in lowest position, wheels locked, appropriate side rails in place.

## 2019-01-24 DIAGNOSIS — I10 ESSENTIAL (PRIMARY) HYPERTENSION: ICD-10-CM

## 2019-01-24 DIAGNOSIS — E11.9 TYPE 2 DIABETES MELLITUS WITHOUT COMPLICATIONS: ICD-10-CM

## 2019-01-24 LAB
ANION GAP SERPL CALC-SCNC: 8 MMOL/L — SIGNIFICANT CHANGE UP (ref 5–17)
BUN SERPL-MCNC: 19 MG/DL — HIGH (ref 7–18)
CALCIUM SERPL-MCNC: 8.4 MG/DL — SIGNIFICANT CHANGE UP (ref 8.4–10.5)
CHLORIDE SERPL-SCNC: 101 MMOL/L — SIGNIFICANT CHANGE UP (ref 96–108)
CO2 SERPL-SCNC: 29 MMOL/L — SIGNIFICANT CHANGE UP (ref 22–31)
CREAT SERPL-MCNC: 1.2 MG/DL — SIGNIFICANT CHANGE UP (ref 0.5–1.3)
GLUCOSE BLDC GLUCOMTR-MCNC: 155 MG/DL — HIGH (ref 70–99)
GLUCOSE BLDC GLUCOMTR-MCNC: 171 MG/DL — HIGH (ref 70–99)
GLUCOSE BLDC GLUCOMTR-MCNC: 182 MG/DL — HIGH (ref 70–99)
GLUCOSE BLDC GLUCOMTR-MCNC: 97 MG/DL — SIGNIFICANT CHANGE UP (ref 70–99)
GLUCOSE SERPL-MCNC: 155 MG/DL — HIGH (ref 70–99)
HBA1C BLD-MCNC: 7.5 % — HIGH (ref 4–5.6)
HCT VFR BLD CALC: 41.8 % — SIGNIFICANT CHANGE UP (ref 39–50)
HCV AB S/CO SERPL IA: 0.04 S/CO — SIGNIFICANT CHANGE UP
HCV AB SERPL-IMP: SIGNIFICANT CHANGE UP
HGB BLD-MCNC: 14.1 G/DL — SIGNIFICANT CHANGE UP (ref 13–17)
MCHC RBC-ENTMCNC: 31.5 PG — SIGNIFICANT CHANGE UP (ref 27–34)
MCHC RBC-ENTMCNC: 33.6 GM/DL — SIGNIFICANT CHANGE UP (ref 32–36)
MCV RBC AUTO: 93.8 FL — SIGNIFICANT CHANGE UP (ref 80–100)
PLATELET # BLD AUTO: 194 K/UL — SIGNIFICANT CHANGE UP (ref 150–400)
POTASSIUM SERPL-MCNC: 3.8 MMOL/L — SIGNIFICANT CHANGE UP (ref 3.5–5.3)
POTASSIUM SERPL-SCNC: 3.8 MMOL/L — SIGNIFICANT CHANGE UP (ref 3.5–5.3)
RBC # BLD: 4.46 M/UL — SIGNIFICANT CHANGE UP (ref 4.2–5.8)
RBC # FLD: 12 % — SIGNIFICANT CHANGE UP (ref 10.3–14.5)
SODIUM SERPL-SCNC: 138 MMOL/L — SIGNIFICANT CHANGE UP (ref 135–145)
WBC # BLD: 8.9 K/UL — SIGNIFICANT CHANGE UP (ref 3.8–10.5)
WBC # FLD AUTO: 8.9 K/UL — SIGNIFICANT CHANGE UP (ref 3.8–10.5)

## 2019-01-24 PROCEDURE — 99232 SBSQ HOSP IP/OBS MODERATE 35: CPT

## 2019-01-24 PROCEDURE — 74019 RADEX ABDOMEN 2 VIEWS: CPT | Mod: 26

## 2019-01-24 RX ADMIN — HEPARIN SODIUM 5000 UNIT(S): 5000 INJECTION INTRAVENOUS; SUBCUTANEOUS at 14:15

## 2019-01-24 RX ADMIN — Medication 25 MILLIGRAM(S): at 05:25

## 2019-01-24 RX ADMIN — Medication 2: at 08:01

## 2019-01-24 RX ADMIN — Medication 12.5 MILLIGRAM(S): at 05:25

## 2019-01-24 RX ADMIN — HEPARIN SODIUM 5000 UNIT(S): 5000 INJECTION INTRAVENOUS; SUBCUTANEOUS at 22:21

## 2019-01-24 RX ADMIN — Medication 2: at 12:02

## 2019-01-24 RX ADMIN — HEPARIN SODIUM 5000 UNIT(S): 5000 INJECTION INTRAVENOUS; SUBCUTANEOUS at 05:25

## 2019-01-24 RX ADMIN — Medication 2: at 17:08

## 2019-01-24 NOTE — CONSULT NOTE ADULT - SUBJECTIVE AND OBJECTIVE BOX
Patient is a 68y old  Male who presents with a chief complaint of Abdominal pain (2019 10:23)    History of Present Illness:  Reason for Admission: psbo	  History of Present Illness: 	  69 y/o male with DM, HTN, h/o liver abscess in 2009; had open surgery here for liver abscess with post-op wound closure secondarily  has multiple chronic ventral hernias with admission in 2017 for psbo that resolved with conservative management  comes with c/o several days of lower abd cramping; +bm/flatus  no f/c/n/v    Awake, alert, comfortable in NAD. Tolerating po clears.    INTERVAL HPI/OVERNIGHT EVENTS:  T(C): 36.9 (19 @ 12:46), Max: 37.1 (19 @ 00:13)  HR: 59 (19 @ 12:46) (57 - 73)  BP: 115/70 (19 @ 12:46) (113/61 - 129/78)  RR: 16 (19 @ 12:46) (16 - 18)  SpO2: 97% (19 @ 12:46) (97% - 100%)  Wt(kg): --  I&O's Summary      PAST MEDICAL & SURGICAL HISTORY:  DM (diabetes mellitus)  Liver abscess  Liver abscess: Drainage of liver abscess      SOCIAL HISTORY  Alcohol:  Tobacco:  Illicit substance use:      FAMILY HISTORY:      LABS:                        14.1   8.9   )-----------( 194      ( 2019 07:46 )             41.8         138  |  101  |  19<H>  ----------------------------<  155<H>  3.8   |  29  |  1.20    Ca    8.4      2019 07:46    TPro  7.2  /  Alb  3.8  /  TBili  0.9  /  DBili  x   /  AST  18  /  ALT  25  /  AlkPhos  73      PT/INR - ( 2019 16:00 )   PT: 12.3 sec;   INR: 1.10 ratio         PTT - ( 2019 16:00 )  PTT:26.0 sec  Urinalysis Basic - ( 2019 16:00 )    Color: Yellow / Appearance: Clear / S.025 / pH: x  Gluc: x / Ketone: Trace  / Bili: Small / Urobili: 1   Blood: x / Protein: 30 mg/dL / Nitrite: Negative   Leuk Esterase: Trace / RBC: 5-10 /HPF / WBC 0-2 /HPF   Sq Epi: x / Non Sq Epi: Few /HPF / Bacteria: Few /HPF      CAPILLARY BLOOD GLUCOSE      POCT Blood Glucose.: 182 mg/dL (2019 11:11)  POCT Blood Glucose.: 155 mg/dL (2019 07:43)  POCT Blood Glucose.: 162 mg/dL (2019 22:32)        Urinalysis Basic - ( 2019 16:00 )    Color: Yellow / Appearance: Clear / S.025 / pH: x  Gluc: x / Ketone: Trace  / Bili: Small / Urobili: 1   Blood: x / Protein: 30 mg/dL / Nitrite: Negative   Leuk Esterase: Trace / RBC: 5-10 /HPF / WBC 0-2 /HPF   Sq Epi: x / Non Sq Epi: Few /HPF / Bacteria: Few /HPF        MEDICATIONS  (STANDING):  dextrose 5% + sodium chloride 0.45%. 1000 milliLiter(s) (125 mL/Hr) IV Continuous <Continuous>  dextrose 5%. 1000 milliLiter(s) (50 mL/Hr) IV Continuous <Continuous>  dextrose 50% Injectable 12.5 Gram(s) IV Push once  dextrose 50% Injectable 25 Gram(s) IV Push once  dextrose 50% Injectable 25 Gram(s) IV Push once  heparin  Injectable 5000 Unit(s) SubCutaneous every 8 hours  hydrochlorothiazide 12.5 milliGRAM(s) Oral daily  insulin lispro (HumaLOG) corrective regimen sliding scale   SubCutaneous three times a day before meals  metoprolol succinate ER 25 milliGRAM(s) Oral daily    MEDICATIONS  (PRN):  dextrose 40% Gel 15 Gram(s) Oral once PRN Blood Glucose LESS THAN 70 milliGRAM(s)/deciliter  glucagon  Injectable 1 milliGRAM(s) IntraMuscular once PRN Glucose LESS THAN 70 milligrams/deciliter  ondansetron Injectable 4 milliGRAM(s) IV Push every 6 hours PRN Nausea and/or Vomiting      REVIEW OF SYSTEMS:  CONSTITUTIONAL: No fever, weight loss, or fatigue  EYES: No eye pain, visual disturbances, or discharge  ENMT:  No difficulty hearing, tinnitus, vertigo; No sinus or throat pain  NECK: No pain or stiffness  RESPIRATORY: No cough, wheezing, chills or hemoptysis; No shortness of breath  CARDIOVASCULAR: No chest pain, palpitations, dizziness, or leg swelling  GASTROINTESTINAL: No abdominal or epigastric pain. No nausea, vomiting, or hematemesis; No diarrhea or constipation. No melena or hematochezia.  GENITOURINARY: No dysuria, frequency, hematuria, or incontinence  NEUROLOGICAL: No headaches, memory loss, loss of strength, numbness, or tremors  SKIN: No itching, burning, rashes, or lesions   LYMPH NODES: No enlarged glands  ENDOCRINE: No heat or cold intolerance; No hair loss  MUSCULOSKELETAL: No joint pain or swelling; No muscle, back, or extremity pain  PSYCHIATRIC: No depression, anxiety, mood swings, or difficulty sleeping  HEME/LYMPH: No easy bruising, or bleeding gums  ALLERY AND IMMUNOLOGIC: No hives or eczema    PHYSICAL EXAM:  GENERAL: NAD, well-groomed, well-developed  HEAD:  Atraumatic, Normocephalic  EYES: EOMI, PERRLA, conjunctiva and sclera clear  ENMT: No tonsillar erythema, exudates, or enlargement; Moist mucous membranes, Good dentition, No lesions  NECK: Supple, No JVD, Normal thyroid  NERVOUS SYSTEM:  Alert & Oriented X3, Good concentration; Motor Strength 5/5 B/L upper and lower extremities; DTRs 2+ intact and symmetric  CHEST/LUNG: Clear to percussion bilaterally; No rales, rhonchi, wheezing, or rubs  HEART: Regular rate and rhythm; No murmurs, rubs, or gallops  ABDOMEN: Soft, Nontender, Nondistended; Bowel sounds present  EXTREMITIES:  2+ Peripheral Pulses, No clubbing, cyanosis, or edema  LYMPH: No lymphadenopathy noted  SKIN: No rashes or lesions    RADIOLOGY & ADDITIONAL TESTS:    Imaging Personally Reviewed:  [ ] YES  [ ] NO    Consultant(s) Notes Reviewed:  [ ] YES  [ ] NO        Care Discussed with Consultants/Other Providers [ ] YES  [ ] NO

## 2019-01-24 NOTE — CHART NOTE - NSCHARTNOTEFT_GEN_A_CORE
Abdominal x-ray reviewed    Advance diet to clears today  Possible regular in AM  D/c planning        D/w Dr. Wen and agreed

## 2019-01-24 NOTE — PROGRESS NOTE ADULT - SUBJECTIVE AND OBJECTIVE BOX
Pt seen at bedside  Patient is a 68y old  Male who presents with a chief complaint of psbo (23 Jan 2019 21:30)      INTERVAL HPI/OVERNIGHT EVENTS:  Pt states feels well this AM  Admits to passing flatus and having BM      Vital Signs Last 24 Hrs  T(C): 36.7 (24 Jan 2019 05:32), Max: 37.1 (24 Jan 2019 00:13)  T(F): 98.1 (24 Jan 2019 05:32), Max: 98.8 (24 Jan 2019 00:13)  HR: 57 (24 Jan 2019 05:32) (57 - 73)  BP: 129/78 (24 Jan 2019 05:32) (113/61 - 129/78)  BP(mean): --  RR: 16 (24 Jan 2019 05:32) (16 - 18)  SpO2: 100% (24 Jan 2019 05:32) (98% - 100%)    Physical Exam:    Gen: awake, alert oriented NAD  HEENT: anicteric  Abd: well healed midline abdominal wound, multiple hernias, not tender, not distended      MEDICATIONS  (STANDING):  dextrose 5% + sodium chloride 0.45%. 1000 milliLiter(s) (125 mL/Hr) IV Continuous <Continuous>  dextrose 5%. 1000 milliLiter(s) (50 mL/Hr) IV Continuous <Continuous>  dextrose 50% Injectable 12.5 Gram(s) IV Push once  dextrose 50% Injectable 25 Gram(s) IV Push once  dextrose 50% Injectable 25 Gram(s) IV Push once  heparin  Injectable 5000 Unit(s) SubCutaneous every 8 hours  hydrochlorothiazide 12.5 milliGRAM(s) Oral daily  insulin lispro (HumaLOG) corrective regimen sliding scale   SubCutaneous three times a day before meals  metoprolol succinate ER 25 milliGRAM(s) Oral daily    MEDICATIONS  (PRN):  dextrose 40% Gel 15 Gram(s) Oral once PRN Blood Glucose LESS THAN 70 milliGRAM(s)/deciliter  glucagon  Injectable 1 milliGRAM(s) IntraMuscular once PRN Glucose LESS THAN 70 milligrams/deciliter  ondansetron Injectable 4 milliGRAM(s) IV Push every 6 hours PRN Nausea and/or Vomiting                            14.1   8.9   )-----------( 194      ( 24 Jan 2019 07:46 )             41.8     01-24    138  |  101  |  19<H>  ----------------------------<  155<H>  3.8   |  29  |  1.20    Ca    8.4      24 Jan 2019 07:46    TPro  7.2  /  Alb  3.8  /  TBili  0.9  /  DBili  x   /  AST  18  /  ALT  25  /  AlkPhos  73  01-23    PT/INR - ( 23 Jan 2019 16:00 )   PT: 12.3 sec;   INR: 1.10 ratio         PTT - ( 23 Jan 2019 16:00 )  PTT:26.0 sec          Radiology:  AXR pending

## 2019-01-24 NOTE — PROGRESS NOTE ADULT - ASSESSMENT
Partial Small bowel obstruction secondary to chronic ventral hernia reduced in emergency room. Obstruction resolving  1. Awaiting official report AXR  2. OOB to chair, ambulation  3. Cont NPO for now, possible advance to clears

## 2019-01-24 NOTE — PROGRESS NOTE ADULT - SUBJECTIVE AND OBJECTIVE BOX
INTERVAL HPI/OVERNIGHT EVENTS:  Pt stable.   NPO  flatus/BM.  No abdominal pain    Vital Signs Last 24 Hrs  T(C): 36.9 (24 Jan 2019 12:46), Max: 37.1 (24 Jan 2019 00:13)  T(F): 98.5 (24 Jan 2019 12:46), Max: 98.8 (24 Jan 2019 00:13)  HR: 59 (24 Jan 2019 12:46) (57 - 73)  BP: 115/70 (24 Jan 2019 12:46) (113/61 - 129/78)  BP(mean): --  RR: 16 (24 Jan 2019 12:46) (16 - 18)  SpO2: 97% (24 Jan 2019 12:46) (97% - 100%)    Physical:  Abdomen: Soft nondistended, nontender.  Multiple incisional hernias, reducible, non tender    I&O's Summary      LABS:                        14.1   8.9   )-----------( 194      ( 24 Jan 2019 07:46 )             41.8             01-24    138  |  101  |  19<H>  ----------------------------<  155<H>  3.8   |  29  |  1.20    Ca    8.4      24 Jan 2019 07:46    TPro  7.2  /  Alb  3.8  /  TBili  0.9  /  DBili  x   /  AST  18  /  ALT  25  /  AlkPhos  73  01-23

## 2019-01-25 ENCOUNTER — INBOUND DOCUMENT (OUTPATIENT)
Age: 69
End: 2019-01-25

## 2019-01-25 ENCOUNTER — TRANSCRIPTION ENCOUNTER (OUTPATIENT)
Age: 69
End: 2019-01-25

## 2019-01-25 VITALS
TEMPERATURE: 98 F | RESPIRATION RATE: 17 BRPM | DIASTOLIC BLOOD PRESSURE: 72 MMHG | OXYGEN SATURATION: 100 % | HEART RATE: 91 BPM | SYSTOLIC BLOOD PRESSURE: 130 MMHG

## 2019-01-25 LAB
ANION GAP SERPL CALC-SCNC: 8 MMOL/L — SIGNIFICANT CHANGE UP (ref 5–17)
BUN SERPL-MCNC: 18 MG/DL — SIGNIFICANT CHANGE UP (ref 7–18)
CALCIUM SERPL-MCNC: 8.5 MG/DL — SIGNIFICANT CHANGE UP (ref 8.4–10.5)
CHLORIDE SERPL-SCNC: 102 MMOL/L — SIGNIFICANT CHANGE UP (ref 96–108)
CO2 SERPL-SCNC: 28 MMOL/L — SIGNIFICANT CHANGE UP (ref 22–31)
CREAT SERPL-MCNC: 1.1 MG/DL — SIGNIFICANT CHANGE UP (ref 0.5–1.3)
GLUCOSE BLDC GLUCOMTR-MCNC: 127 MG/DL — HIGH (ref 70–99)
GLUCOSE BLDC GLUCOMTR-MCNC: 163 MG/DL — HIGH (ref 70–99)
GLUCOSE SERPL-MCNC: 120 MG/DL — HIGH (ref 70–99)
HCT VFR BLD CALC: 42.3 % — SIGNIFICANT CHANGE UP (ref 39–50)
HGB BLD-MCNC: 14 G/DL — SIGNIFICANT CHANGE UP (ref 13–17)
MCHC RBC-ENTMCNC: 31.5 PG — SIGNIFICANT CHANGE UP (ref 27–34)
MCHC RBC-ENTMCNC: 33 GM/DL — SIGNIFICANT CHANGE UP (ref 32–36)
MCV RBC AUTO: 95.5 FL — SIGNIFICANT CHANGE UP (ref 80–100)
PLATELET # BLD AUTO: 184 K/UL — SIGNIFICANT CHANGE UP (ref 150–400)
POTASSIUM SERPL-MCNC: 3.5 MMOL/L — SIGNIFICANT CHANGE UP (ref 3.5–5.3)
POTASSIUM SERPL-SCNC: 3.5 MMOL/L — SIGNIFICANT CHANGE UP (ref 3.5–5.3)
RBC # BLD: 4.43 M/UL — SIGNIFICANT CHANGE UP (ref 4.2–5.8)
RBC # FLD: 12 % — SIGNIFICANT CHANGE UP (ref 10.3–14.5)
SODIUM SERPL-SCNC: 138 MMOL/L — SIGNIFICANT CHANGE UP (ref 135–145)
WBC # BLD: 7.6 K/UL — SIGNIFICANT CHANGE UP (ref 3.8–10.5)
WBC # FLD AUTO: 7.6 K/UL — SIGNIFICANT CHANGE UP (ref 3.8–10.5)

## 2019-01-25 PROCEDURE — 85610 PROTHROMBIN TIME: CPT

## 2019-01-25 PROCEDURE — 83036 HEMOGLOBIN GLYCOSYLATED A1C: CPT

## 2019-01-25 PROCEDURE — 81001 URINALYSIS AUTO W/SCOPE: CPT

## 2019-01-25 PROCEDURE — 99285 EMERGENCY DEPT VISIT HI MDM: CPT | Mod: 25

## 2019-01-25 PROCEDURE — 74019 RADEX ABDOMEN 2 VIEWS: CPT

## 2019-01-25 PROCEDURE — 82962 GLUCOSE BLOOD TEST: CPT

## 2019-01-25 PROCEDURE — 80048 BASIC METABOLIC PNL TOTAL CA: CPT

## 2019-01-25 PROCEDURE — 84484 ASSAY OF TROPONIN QUANT: CPT

## 2019-01-25 PROCEDURE — 74177 CT ABD & PELVIS W/CONTRAST: CPT

## 2019-01-25 PROCEDURE — 99232 SBSQ HOSP IP/OBS MODERATE 35: CPT

## 2019-01-25 PROCEDURE — 86803 HEPATITIS C AB TEST: CPT

## 2019-01-25 PROCEDURE — 80053 COMPREHEN METABOLIC PANEL: CPT

## 2019-01-25 PROCEDURE — 36415 COLL VENOUS BLD VENIPUNCTURE: CPT

## 2019-01-25 PROCEDURE — 83690 ASSAY OF LIPASE: CPT

## 2019-01-25 PROCEDURE — 85730 THROMBOPLASTIN TIME PARTIAL: CPT

## 2019-01-25 PROCEDURE — 85027 COMPLETE CBC AUTOMATED: CPT

## 2019-01-25 RX ADMIN — Medication 12.5 MILLIGRAM(S): at 05:53

## 2019-01-25 RX ADMIN — Medication 2: at 11:59

## 2019-01-25 RX ADMIN — HEPARIN SODIUM 5000 UNIT(S): 5000 INJECTION INTRAVENOUS; SUBCUTANEOUS at 05:53

## 2019-01-25 NOTE — PROGRESS NOTE ADULT - SUBJECTIVE AND OBJECTIVE BOX
pt seen in icu [  ], reg med floor [ x  ], bed [ x ], chair at bedside [   ]    Pt is alert, awake, lying in bed, in NAD. Pt had + bowel movement, normal bowel per pt. Diet has been advanced. Does not have any new complaints at this time.     REVIEW OF SYSTEMS:    CONSTITUTIONAL: No weakness, fevers or chills  EYES/ENT: No visual changes;  No vertigo or throat pain   NECK: No pain or stiffness  RESPIRATORY: No cough, wheezing, hemoptysis; No shortness of breath  CARDIOVASCULAR: No chest pain or palpitations  GASTROINTESTINAL: No abdominal or epigastric pain. No nausea, vomiting, or hematemesis; No diarrhea or constipation. No melena or hematochezia.  GENITOURINARY: No dysuria, frequency or hematuria  NEUROLOGICAL: No numbness or weakness  SKIN: No itching, burning, rashes, or lesions   All other review of systems is negative unless indicated above.    Physical Exam    General: WN/WD NAD  Neurology: A&Ox3, nonfocal, KELLER x 4  Respiratory: CTA B/L  CV: RRR, S1S2, no murmurs, rubs or gallops  Abdominal: Soft, NT, ND +BS, Last BM  Extremities: No edema, + peripheral pulses      Allergies  No Known Allergies      Health Issues  Small bowel obstruction  DM (diabetes mellitus)  Liver abscess  Liver abscess      Vitals  T(F): 97.3 (01-25-19 @ 05:07), Max: 98.5 (01-24-19 @ 12:46)  HR: 56 (01-25-19 @ 05:07) (56 - 60)  BP: 129/75 (01-25-19 @ 05:07) (115/70 - 129/75)  RR: 16 (01-25-19 @ 05:07) (16 - 16)  SpO2: 100% (01-25-19 @ 05:07) (97% - 100%)  Wt(kg): --  CAPILLARY BLOOD GLUCOSE      POCT Blood Glucose.: 163 mg/dL (25 Jan 2019 11:40)      Labs                          14.0   7.6   )-----------( 184      ( 25 Jan 2019 07:02 )             42.3       01-25    138  |  102  |  18  ----------------------------<  120<H>  3.5   |  28  |  1.10    Ca    8.5      25 Jan 2019 07:02    TPro  7.2  /  Alb  3.8  /  TBili  0.9  /  DBili  x   /  AST  18  /  ALT  25  /  AlkPhos  73  01-23      CARDIAC MARKERS ( 23 Jan 2019 16:00 )  <0.015 ng/mL / x     / x     / x     / x        Basic Metabolic Panel in AM (01.25.19 @ 07:02)    Blood Urea Nitrogen, Serum: 18 mg/dL      Hemoglobin A1C, Whole Blood (01.24.19 @ 13:44)    Hemoglobin A1C, Whole Blood: 7.5: Method: Immunoassay       Reference Range                4.0-5.6%       High risk (prediabetic)        5.7-6.4%       Diabetic, diagnostic             >=6.5%       ADA diabetic treatment goal       <7.0%  The Hemoglobin A1c testing is NGSP-certified.Reference ranges are based  upon the 2010 recommendations of  the American Diabetes Association.  Interpretation may vary for children  and adolescents. %          Radiology Results  < from: Xray Abdomen 2 Views (01.24.19 @ 09:55) >    IMPRESSION:    Contrast is seen to the level of the rectum with persistent bowel   dilatation in the mid abdomen suggesting partial small bowel obstruction    < end of copied text >      < from: CT Abdomen and Pelvis w/ Oral Cont and w/ IV Cont (01.23.19 @ 18:32) >  IMPRESSION: Partial small bowel obstruction. This appears to be secondary   to a ventral hernia in the lower anterior abdominal wall in the midline.   There is an additional wide mouth superior nonobstructing ventral hernia   and an additional more lateral right nonobstructing small bowel   containing ventral hernia. An additional left inferior pelvic ventral   hernia contains distended bowel loops without a transition.Results were   discussed with the ER resident Dr. Aden at 7:10 PM on January 24, 2019.      < end of copied text >    Meds    MEDICATIONS  (STANDING):  dextrose 5% + sodium chloride 0.45%. 1000 milliLiter(s) (125 mL/Hr) IV Continuous <Continuous>  dextrose 5%. 1000 milliLiter(s) (50 mL/Hr) IV Continuous <Continuous>  dextrose 50% Injectable 12.5 Gram(s) IV Push once  dextrose 50% Injectable 25 Gram(s) IV Push once  dextrose 50% Injectable 25 Gram(s) IV Push once  heparin  Injectable 5000 Unit(s) SubCutaneous every 8 hours  hydrochlorothiazide 12.5 milliGRAM(s) Oral daily  insulin lispro (HumaLOG) corrective regimen sliding scale   SubCutaneous three times a day before meals  metoprolol succinate ER 25 milliGRAM(s) Oral daily      MEDICATIONS  (PRN):  dextrose 40% Gel 15 Gram(s) Oral once PRN Blood Glucose LESS THAN 70 milliGRAM(s)/deciliter  glucagon  Injectable 1 milliGRAM(s) IntraMuscular once PRN Glucose LESS THAN 70 milligrams/deciliter  ondansetron Injectable 4 milliGRAM(s) IV Push every 6 hours PRN Nausea and/or Vomiting pt seen in icu [  ], reg med floor [ x  ], bed [ x ], chair at bedside [   ]    Pt is alert, awake, lying in bed, in NAD. Pt had + bowel movement, normal bowel per pt. Diet has been advanced. Does not have any new complaints at this time. Stable for DC home    REVIEW OF SYSTEMS:    CONSTITUTIONAL: No weakness, fevers or chills  EYES/ENT: No visual changes;  No vertigo or throat pain   NECK: No pain or stiffness  RESPIRATORY: No cough, wheezing, hemoptysis; No shortness of breath  CARDIOVASCULAR: No chest pain or palpitations  GASTROINTESTINAL: No abdominal or epigastric pain. No nausea, vomiting, or hematemesis; No diarrhea or constipation. No melena or hematochezia.  GENITOURINARY: No dysuria, frequency or hematuria  NEUROLOGICAL: No numbness or weakness  SKIN: No itching, burning, rashes, or lesions   All other review of systems is negative unless indicated above.    Physical Exam    General: WN/WD NAD  Neurology: A&Ox3, nonfocal, KELLER x 4  Respiratory: CTA B/L  CV: RRR, S1S2, no murmurs, rubs or gallops  Abdominal: Soft, NT, ND +BS, Last BM  Extremities: No edema, + peripheral pulses      Allergies  No Known Allergies      Health Issues  Small bowel obstruction  DM (diabetes mellitus)  Liver abscess  Liver abscess      Vitals  T(F): 97.3 (01-25-19 @ 05:07), Max: 98.5 (01-24-19 @ 12:46)  HR: 56 (01-25-19 @ 05:07) (56 - 60)  BP: 129/75 (01-25-19 @ 05:07) (115/70 - 129/75)  RR: 16 (01-25-19 @ 05:07) (16 - 16)  SpO2: 100% (01-25-19 @ 05:07) (97% - 100%)  Wt(kg): --  CAPILLARY BLOOD GLUCOSE      POCT Blood Glucose.: 163 mg/dL (25 Jan 2019 11:40)      Labs                          14.0   7.6   )-----------( 184      ( 25 Jan 2019 07:02 )             42.3       01-25    138  |  102  |  18  ----------------------------<  120<H>  3.5   |  28  |  1.10    Ca    8.5      25 Jan 2019 07:02    TPro  7.2  /  Alb  3.8  /  TBili  0.9  /  DBili  x   /  AST  18  /  ALT  25  /  AlkPhos  73  01-23      CARDIAC MARKERS ( 23 Jan 2019 16:00 )  <0.015 ng/mL / x     / x     / x     / x        Basic Metabolic Panel in AM (01.25.19 @ 07:02)    Blood Urea Nitrogen, Serum: 18 mg/dL      Hemoglobin A1C, Whole Blood (01.24.19 @ 13:44)    Hemoglobin A1C, Whole Blood: 7.5: Method: Immunoassay       Reference Range                4.0-5.6%       High risk (prediabetic)        5.7-6.4%       Diabetic, diagnostic             >=6.5%       ADA diabetic treatment goal       <7.0%  The Hemoglobin A1c testing is NGSP-certified.Reference ranges are based  upon the 2010 recommendations of  the American Diabetes Association.  Interpretation may vary for children  and adolescents. %          Radiology Results  < from: Xray Abdomen 2 Views (01.24.19 @ 09:55) >    IMPRESSION:    Contrast is seen to the level of the rectum with persistent bowel   dilatation in the mid abdomen suggesting partial small bowel obstruction    < end of copied text >      < from: CT Abdomen and Pelvis w/ Oral Cont and w/ IV Cont (01.23.19 @ 18:32) >  IMPRESSION: Partial small bowel obstruction. This appears to be secondary   to a ventral hernia in the lower anterior abdominal wall in the midline.   There is an additional wide mouth superior nonobstructing ventral hernia   and an additional more lateral right nonobstructing small bowel   containing ventral hernia. An additional left inferior pelvic ventral   hernia contains distended bowel loops without a transition.Results were   discussed with the ER resident Dr. Aden at 7:10 PM on January 24, 2019.      < end of copied text >    Meds    MEDICATIONS  (STANDING):  dextrose 5% + sodium chloride 0.45%. 1000 milliLiter(s) (125 mL/Hr) IV Continuous <Continuous>  dextrose 5%. 1000 milliLiter(s) (50 mL/Hr) IV Continuous <Continuous>  dextrose 50% Injectable 12.5 Gram(s) IV Push once  dextrose 50% Injectable 25 Gram(s) IV Push once  dextrose 50% Injectable 25 Gram(s) IV Push once  heparin  Injectable 5000 Unit(s) SubCutaneous every 8 hours  hydrochlorothiazide 12.5 milliGRAM(s) Oral daily  insulin lispro (HumaLOG) corrective regimen sliding scale   SubCutaneous three times a day before meals  metoprolol succinate ER 25 milliGRAM(s) Oral daily      MEDICATIONS  (PRN):  dextrose 40% Gel 15 Gram(s) Oral once PRN Blood Glucose LESS THAN 70 milliGRAM(s)/deciliter  glucagon  Injectable 1 milliGRAM(s) IntraMuscular once PRN Glucose LESS THAN 70 milligrams/deciliter  ondansetron Injectable 4 milliGRAM(s) IV Push every 6 hours PRN Nausea and/or Vomiting

## 2019-01-25 NOTE — DISCHARGE NOTE ADULT - HOSPITAL COURSE
67 y/o male with DM, HTN, h/o liver abscess in 2009; had open surgery here for liver abscess with post-op wound closure secondarily  has multiple chronic ventral hernias with admission in 2017 for psbo that resolved with conservative management  comes with c/o several days of lower abd cramping; +bm/flatus  no f/c/n/v  CT abd/ pelvis done in ED and showed Partial small bowel obstruction. This appears to be secondary   to a ventral hernia in the lower anterior abdominal wall in the midline.   There is an additional wide mouth superior nonobstructing ventral hernia   and an additional more lateral right nonobstructing small bowel   containing ventral hernia. An additional left inferior pelvic ventral   hernia contains distended bowel loops without a transition.    Pt admitted to surgical services under care of Dr Wen, partial SBO managed conservatively, Pt currently passing flatus, having BMs and tolerating regular diet.   Pt stable for discharge, pt to follow up with Dr Wen in office.

## 2019-01-25 NOTE — DISCHARGE NOTE ADULT - CARE PLAN
Principal Discharge DX:	SBO (small bowel obstruction)  Goal:	tolerating diet  Assessment and plan of treatment:	Follow up with Dr. Wen in 1 week in office  Secondary Diagnosis:	DM (diabetes mellitus)  Assessment and plan of treatment:	continue all home medication, follow with PCP  Secondary Diagnosis:	HLD (hyperlipidemia)  Assessment and plan of treatment:	continue all home medication, follow with PCP  Secondary Diagnosis:	HTN (hypertension)  Assessment and plan of treatment:	continue all home medication, follow with PCP

## 2019-01-25 NOTE — PROGRESS NOTE ADULT - SUBJECTIVE AND OBJECTIVE BOX
INTERVAL HPI/OVERNIGHT EVENTS:  Pt stable.   Tolerating diet.   flatus/BM.    Vital Signs Last 24 Hrs  T(C): 36.3 (25 Jan 2019 05:07), Max: 36.9 (24 Jan 2019 12:46)  T(F): 97.3 (25 Jan 2019 05:07), Max: 98.5 (24 Jan 2019 12:46)  HR: 56 (25 Jan 2019 05:07) (56 - 60)  BP: 129/75 (25 Jan 2019 05:07) (115/70 - 129/75)  BP(mean): --  RR: 16 (25 Jan 2019 05:07) (16 - 16)  SpO2: 100% (25 Jan 2019 05:07) (97% - 100%)    Physical:  Abdomen: Soft nondistended, nontender.  Hernias reducible  No complaints    I&O's Summary      LABS:                        14.0   7.6   )-----------( 184      ( 25 Jan 2019 07:02 )             42.3             01-25    138  |  102  |  18  ----------------------------<  120<H>  3.5   |  28  |  1.10    Ca    8.5      25 Jan 2019 07:02    TPro  7.2  /  Alb  3.8  /  TBili  0.9  /  DBili  x   /  AST  18  /  ALT  25  /  AlkPhos  73  01-23

## 2019-01-25 NOTE — DISCHARGE NOTE ADULT - MEDICATION SUMMARY - MEDICATIONS TO TAKE
I will START or STAY ON the medications listed below when I get home from the hospital:    Janumet 50 mg-1000 mg oral tablet  -- 1 tab(s) by mouth 2 times a day  -- Indication: For as directed    metoprolol succinate 25 mg oral tablet, extended release  -- 1 tab(s) by mouth once a day  -- Indication: For as directed    hydroCHLOROthiazide 12.5 mg oral capsule  -- 1 cap(s) by mouth once a day  -- Indication: For as directed

## 2019-01-25 NOTE — PROGRESS NOTE ADULT - SUBJECTIVE AND OBJECTIVE BOX
Patient examined at bedside, denies abdominal pain  No nausea, no vomiting  + flatus and BM  Tolerating clear diet    T(C): 36.3 (01-25-19 @ 05:07), Max: 36.9 (01-24-19 @ 12:46)  HR: 56 (01-25-19 @ 05:07) (56 - 60)  BP: 129/75 (01-25-19 @ 05:07) (115/70 - 129/75)  RR: 16 (01-25-19 @ 05:07) (16 - 16)  SpO2: 100% (01-25-19 @ 05:07) (97% - 100%)  Wt(kg): --      Physical Exam  General: AAOx3, No acute distress  Skin: No jaundice, no icterus  Abdomen: soft, nontender, nondistended, reducible ventral hernia   : Normal external genitalia  Extremities: non edematous, no calf pain bilaterally                          14.0   7.6   )-----------( 184      ( 25 Jan 2019 07:02 )             42.3   01-25    138  |  102  |  18  ----------------------------<  120<H>  3.5   |  28  |  1.10    Ca    8.5      25 Jan 2019 07:02    TPro  7.2  /  Alb  3.8  /  TBili  0.9  /  DBili  x   /  AST  18  /  ALT  25  /  AlkPhos  73  01-23

## 2019-01-25 NOTE — DISCHARGE NOTE ADULT - PATIENT PORTAL LINK FT
You can access the [facility] Patient Portal, offered by [facility], by registering with the following website: [email or web address]

## 2019-01-25 NOTE — PROGRESS NOTE ADULT - ASSESSMENT
69yo male with reducible ventral hernia    1) advance to reg diet  2) oob ambulate encouraged  3) discharge planning with outpt f/u if pt tolerates reg diet

## 2019-01-25 NOTE — DISCHARGE NOTE ADULT - PLAN OF CARE
tolerating diet Follow up with Dr. Wen in 1 week in office continue all home medication, follow with PCP

## 2019-01-29 ENCOUNTER — RX RENEWAL (OUTPATIENT)
Age: 69
End: 2019-01-29

## 2019-01-29 RX ORDER — HYDROCHLOROTHIAZIDE 12.5 MG/1
12.5 CAPSULE ORAL DAILY
Qty: 90 | Refills: 0 | Status: DISCONTINUED | COMMUNITY
Start: 2017-03-16 | End: 2019-01-29

## 2019-02-01 ENCOUNTER — APPOINTMENT (OUTPATIENT)
Dept: ENDOCRINOLOGY | Facility: CLINIC | Age: 69
End: 2019-02-01
Payer: COMMERCIAL

## 2019-02-01 VITALS
SYSTOLIC BLOOD PRESSURE: 131 MMHG | HEART RATE: 74 BPM | HEIGHT: 67 IN | WEIGHT: 174 LBS | RESPIRATION RATE: 16 BRPM | OXYGEN SATURATION: 98 % | DIASTOLIC BLOOD PRESSURE: 83 MMHG | BODY MASS INDEX: 27.31 KG/M2

## 2019-02-01 PROBLEM — E78.5 HYPERLIPIDEMIA, UNSPECIFIED: Chronic | Status: ACTIVE | Noted: 2019-01-23

## 2019-02-01 PROBLEM — I10 ESSENTIAL (PRIMARY) HYPERTENSION: Chronic | Status: ACTIVE | Noted: 2019-01-23

## 2019-02-01 PROBLEM — K66.8 OTHER SPECIFIED DISORDERS OF PERITONEUM: Chronic | Status: ACTIVE | Noted: 2019-01-23

## 2019-02-01 PROCEDURE — G0009: CPT

## 2019-02-01 PROCEDURE — 90732 PPSV23 VACC 2 YRS+ SUBQ/IM: CPT

## 2019-02-01 PROCEDURE — 99214 OFFICE O/P EST MOD 30 MIN: CPT | Mod: 25

## 2019-02-01 RX ORDER — LANCETS/BLOOD GLUCOSE STRIPS
COMBINATION PACKAGE (EA) MISCELLANEOUS
Qty: 1 | Refills: 0 | Status: ACTIVE | COMMUNITY
Start: 2019-02-01 | End: 1900-01-01

## 2019-02-02 NOTE — DATA REVIEWED
[FreeTextEntry1] : The FBS and HbA1c were slightly elevated. The Fructosamine was fine. The creatinine is slightly elevated and the LDL-C was fine.

## 2019-02-02 NOTE — ASSESSMENT
[FreeTextEntry1] : Patient's diabetes is well controlled\par He has lost weight\par Advised to continue the diet and exercise\par Will continue same treatment\par He had a Pneumovax injection 5 years ago\par Will give him the vaccine today

## 2019-02-02 NOTE — HISTORY OF PRESENT ILLNESS
[FreeTextEntry1] : Patient's diabetes is not well controlled. The glucose levels at home are usually below 120 mg/dl. The FBS was 108  mg/dl on AM  and the HbA1c was 7.3 %.  The patient says that his blood glucose are elevated  during the day. Rarely the blood glucose drops at night. He is exercising and following the diet. His weight is unchanged/has decrease. The patient denies polydipsia, polyuria, increased appetite, blurred vision, weight gain, chest pain, leg edema. His renal function is slightly elevated, he has no retinopathy, no neuropathy. Denies chest pain or palpitations. The hypertension is well controlled, the hyperlipidemia is well controlled. Takes his medications regularly. He has not seen the Ophthalmologist, he has not seen Podiatrist recently. He has seen the Cardiologist recently, he had stress test last year. He had surgery for a liver abscess.\par \par

## 2019-02-07 ENCOUNTER — APPOINTMENT (OUTPATIENT)
Dept: SURGERY | Facility: CLINIC | Age: 69
End: 2019-02-07
Payer: COMMERCIAL

## 2019-02-07 VITALS
WEIGHT: 174 LBS | DIASTOLIC BLOOD PRESSURE: 87 MMHG | SYSTOLIC BLOOD PRESSURE: 135 MMHG | OXYGEN SATURATION: 100 % | HEIGHT: 67 IN | BODY MASS INDEX: 27.31 KG/M2 | HEART RATE: 64 BPM

## 2019-02-07 DIAGNOSIS — K56.609 UNSPECIFIED INTESTINAL OBSTRUCTION, UNSPECIFIED AS TO PARTIAL VERSUS COMPLETE OBSTRUCTION: ICD-10-CM

## 2019-02-07 PROCEDURE — 99213 OFFICE O/P EST LOW 20 MIN: CPT

## 2019-02-07 RX ORDER — METOPROLOL SUCCINATE 25 MG/1
25 TABLET, EXTENDED RELEASE ORAL DAILY
Qty: 90 | Refills: 3 | Status: DISCONTINUED | COMMUNITY
Start: 2017-03-16 | End: 2019-02-07

## 2019-02-07 NOTE — ASSESSMENT
[FreeTextEntry1] : this is a 69 y/o male with DM, HTN, h/o liver abscess in 2009; had open surgery  for liver abscess with post-op wound closure secondary   has multiple chronic ventral hernias with admission in 2017 for possible SBO that resolved with conservative management . patient recently admitted to [facility]  with  lower abd pain . CT abd/ pelvis done in ED and showed Partial small bowel obstruction. This appears to be secondary to a ventral hernia in the lower anterior abdominal wall in the midline.  Patient has another  wide mouth superior nonobstructing ventral hernia  and an  additional left inferior pelvic ventral hernia containing  distended bowel loops. PAtient was  admitted to surgical services under for  partial SBO  and managed conservatively until resolution of SBO. today he is presenting for a follow up visit. patient offers no complaints. patient reports no fever, chills,  or  pain. Patient reports good bowel movements and appetite. due to  prior surgical history  and  the abdominal wall weakness  \par patient was advised not to have hernias repaired  due to the high risk of complication and mortality. patient advised to wear an abdominal binder at all times. patient understands risk and benefits.

## 2019-02-07 NOTE — HISTORY OF PRESENT ILLNESS
[de-identified] : this is a 69 y/o male with DM, HTN, h/o liver abscess in 2009; had open surgery  for liver abscess with post-op wound closure secondary   has multiple chronic ventral hernias with admission in 2017 for possible SBO that resolved with conservative management . patient recently admitted to [facility]  with  lower abd pain . CT abd/ pelvis done in ED and showed Partial small bowel obstruction. This appears to be secondary to a ventral hernia in the lower anterior abdominal wall in the midline.  Patient has another  wide mouth superior nonobstructing ventral hernia  and an  additional left inferior pelvic ventral hernia containing  distended bowel loops. PAtient was  admitted to surgical services under for  partial SBO  and managed conservatively until resolution of SBO. today he is presenting for a follow up visit. patient offers no complaints. patient reports no fever, chills,  or  pain. Patient reports good bowel movements and appetite. \par

## 2019-02-07 NOTE — PHYSICAL EXAM
[Abdominal Masses] : No abdominal masses [Abdomen Tenderness] : ~T ~M No abdominal tenderness [Alert] : alert [Oriented to Person] : oriented to person [Oriented to Place] : oriented to place [Oriented to Time] : oriented to time [Calm] : calm [de-identified] : The patient is alert, well-groomed, and cheerful. [de-identified] : Thorax symmetric with good excursion. Lungs resonant. Breath sounds vesicular with no added sounds. [de-identified] : No signs of  dyspnea, orthopnea. Good S1 and  S2 [de-identified] : multiple large scars . very weak abdominal wall muscles. multiple hernias

## 2019-02-26 ENCOUNTER — RX RENEWAL (OUTPATIENT)
Age: 69
End: 2019-02-26

## 2019-03-02 ENCOUNTER — RX RENEWAL (OUTPATIENT)
Age: 69
End: 2019-03-02

## 2019-03-02 RX ORDER — BLOOD-GLUCOSE METER
W/DEVICE KIT MISCELLANEOUS
Qty: 1 | Refills: 0 | Status: ACTIVE | COMMUNITY
Start: 2019-03-02 | End: 1900-01-01

## 2019-03-03 ENCOUNTER — TRANSCRIPTION ENCOUNTER (OUTPATIENT)
Age: 69
End: 2019-03-03

## 2019-05-16 ENCOUNTER — APPOINTMENT (OUTPATIENT)
Dept: ENDOCRINOLOGY | Facility: CLINIC | Age: 69
End: 2019-05-16
Payer: COMMERCIAL

## 2019-05-16 VITALS
HEART RATE: 62 BPM | RESPIRATION RATE: 16 BRPM | HEIGHT: 67 IN | TEMPERATURE: 98 F | WEIGHT: 174 LBS | SYSTOLIC BLOOD PRESSURE: 122 MMHG | DIASTOLIC BLOOD PRESSURE: 72 MMHG | BODY MASS INDEX: 27.31 KG/M2 | OXYGEN SATURATION: 98 %

## 2019-05-16 PROCEDURE — 99214 OFFICE O/P EST MOD 30 MIN: CPT

## 2019-05-16 NOTE — ASSESSMENT
[FreeTextEntry1] : The patient's diabetes has improved\par His weight is stable\par Will continue the same treatment\par To see Ophthalmologist and to see Podiatrist

## 2019-05-16 NOTE — HISTORY OF PRESENT ILLNESS
[FreeTextEntry1] : Patient is feeling well. His weight is unchanged. He is exercising regularly and following the diet. His  blood glucose at home are well controlled/elevated usually between _110 mg/dl to 140 mg/dl. The FBS in the laboratory was 117 mg/dl and the HbA1c was 6.7 %. He/She] denies/has low blood glucose during the night. He denies chest pain or SOB, denies numbness, tingling sensation and/or burning sensation on extremities. His renal function is fine. He is taking/not taking his medications regularly. He has not seen the Ophthalmologist recently, he has not seen Podiatrist recently. He has seen the Cardiologist recently.\par \par \par

## 2019-05-16 NOTE — PHYSICAL EXAM
[No Acute Distress] : no acute distress [Alert] : alert [Normal Sclera/Conjunctiva] : normal sclera/conjunctiva [PERRL] : pupils equal, round and reactive to light [Normal Outer Ear/Nose] : the ears and nose were normal in appearance [Normal Hearing] : hearing was normal [No Neck Mass] : no neck mass was observed [No Respiratory Distress] : no respiratory distress [Thyroid Not Enlarged] : the thyroid was not enlarged [Normal Rate and Effort] : normal respiratory rhythm and effort [Normal PMI] : the apical impulse was normal [Normal Rate] : heart rate was normal  [Pedal Pulses Normal] : the pedal pulses are present [Carotids Normal] : carotid pulses were normal with no bruits [No Stigmata of Cushings Syndrome] : no stigmata of cushings syndrome [Normal Gait] : normal gait [No Clubbing, Cyanosis] : no clubbing  or cyanosis of the fingernails [No Rash] : no rash [No Skin Lesions] : no skin lesions [No Motor Deficits] : the motor exam was normal [No Sensory Deficits] : the sensory exam was normal to light touch and pinprick [Normal Sensation on Monofilament Testing] : normal sensation on monofilament testing of lower extremities

## 2019-08-20 ENCOUNTER — INPATIENT (INPATIENT)
Facility: HOSPITAL | Age: 69
LOS: 0 days | Discharge: HOME | DRG: 390 | End: 2019-08-21
Attending: SPECIALIST | Admitting: SPECIALIST
Payer: COMMERCIAL

## 2019-08-20 VITALS
WEIGHT: 173.94 LBS | HEIGHT: 67 IN | SYSTOLIC BLOOD PRESSURE: 131 MMHG | OXYGEN SATURATION: 100 % | TEMPERATURE: 97 F | HEART RATE: 62 BPM | DIASTOLIC BLOOD PRESSURE: 86 MMHG | RESPIRATION RATE: 17 BRPM

## 2019-08-20 DIAGNOSIS — K75.0 ABSCESS OF LIVER: Chronic | ICD-10-CM

## 2019-08-21 ENCOUNTER — TRANSCRIPTION ENCOUNTER (OUTPATIENT)
Age: 69
End: 2019-08-21

## 2019-08-21 ENCOUNTER — INBOUND DOCUMENT (OUTPATIENT)
Age: 69
End: 2019-08-21

## 2019-08-21 VITALS
SYSTOLIC BLOOD PRESSURE: 120 MMHG | DIASTOLIC BLOOD PRESSURE: 77 MMHG | HEART RATE: 59 BPM | OXYGEN SATURATION: 99 % | RESPIRATION RATE: 18 BRPM | TEMPERATURE: 98 F

## 2019-08-21 DIAGNOSIS — Z98.890 OTHER SPECIFIED POSTPROCEDURAL STATES: Chronic | ICD-10-CM

## 2019-08-21 DIAGNOSIS — K56.609 UNSPECIFIED INTESTINAL OBSTRUCTION, UNSPECIFIED AS TO PARTIAL VERSUS COMPLETE OBSTRUCTION: ICD-10-CM

## 2019-08-21 DIAGNOSIS — Z90.89 ACQUIRED ABSENCE OF OTHER ORGANS: Chronic | ICD-10-CM

## 2019-08-21 DIAGNOSIS — K56.600 PARTIAL INTESTINAL OBSTRUCTION, UNSPECIFIED AS TO CAUSE: ICD-10-CM

## 2019-08-21 LAB
ALBUMIN SERPL ELPH-MCNC: 3.8 G/DL — SIGNIFICANT CHANGE UP (ref 3.5–5)
ALP SERPL-CCNC: 73 U/L — SIGNIFICANT CHANGE UP (ref 40–120)
ALT FLD-CCNC: 38 U/L DA — SIGNIFICANT CHANGE UP (ref 10–60)
ANION GAP SERPL CALC-SCNC: 8 MMOL/L — SIGNIFICANT CHANGE UP (ref 5–17)
AST SERPL-CCNC: 29 U/L — SIGNIFICANT CHANGE UP (ref 10–40)
BASOPHILS # BLD AUTO: 0.05 K/UL — SIGNIFICANT CHANGE UP (ref 0–0.2)
BASOPHILS NFR BLD AUTO: 0.6 % — SIGNIFICANT CHANGE UP (ref 0–2)
BILIRUB SERPL-MCNC: 0.6 MG/DL — SIGNIFICANT CHANGE UP (ref 0.2–1.2)
BUN SERPL-MCNC: 17 MG/DL — SIGNIFICANT CHANGE UP (ref 7–18)
CALCIUM SERPL-MCNC: 9 MG/DL — SIGNIFICANT CHANGE UP (ref 8.4–10.5)
CHLORIDE SERPL-SCNC: 99 MMOL/L — SIGNIFICANT CHANGE UP (ref 96–108)
CO2 SERPL-SCNC: 28 MMOL/L — SIGNIFICANT CHANGE UP (ref 22–31)
CREAT SERPL-MCNC: 1.21 MG/DL — SIGNIFICANT CHANGE UP (ref 0.5–1.3)
EOSINOPHIL # BLD AUTO: 0.62 K/UL — HIGH (ref 0–0.5)
EOSINOPHIL NFR BLD AUTO: 7.3 % — HIGH (ref 0–6)
GLUCOSE BLDC GLUCOMTR-MCNC: 116 MG/DL — HIGH (ref 70–99)
GLUCOSE BLDC GLUCOMTR-MCNC: 123 MG/DL — HIGH (ref 70–99)
GLUCOSE BLDC GLUCOMTR-MCNC: 145 MG/DL — HIGH (ref 70–99)
GLUCOSE SERPL-MCNC: 127 MG/DL — HIGH (ref 70–99)
HCT VFR BLD CALC: 40 % — SIGNIFICANT CHANGE UP (ref 39–50)
HGB BLD-MCNC: 13.6 G/DL — SIGNIFICANT CHANGE UP (ref 13–17)
IMM GRANULOCYTES NFR BLD AUTO: 0.2 % — SIGNIFICANT CHANGE UP (ref 0–1.5)
LACTATE SERPL-SCNC: 1.6 MMOL/L — SIGNIFICANT CHANGE UP (ref 0.7–2)
LYMPHOCYTES # BLD AUTO: 1.85 K/UL — SIGNIFICANT CHANGE UP (ref 1–3.3)
LYMPHOCYTES # BLD AUTO: 21.7 % — SIGNIFICANT CHANGE UP (ref 13–44)
MCHC RBC-ENTMCNC: 31.9 PG — SIGNIFICANT CHANGE UP (ref 27–34)
MCHC RBC-ENTMCNC: 34 GM/DL — SIGNIFICANT CHANGE UP (ref 32–36)
MCV RBC AUTO: 93.9 FL — SIGNIFICANT CHANGE UP (ref 80–100)
MONOCYTES # BLD AUTO: 0.75 K/UL — SIGNIFICANT CHANGE UP (ref 0–0.9)
MONOCYTES NFR BLD AUTO: 8.8 % — SIGNIFICANT CHANGE UP (ref 2–14)
NEUTROPHILS # BLD AUTO: 5.24 K/UL — SIGNIFICANT CHANGE UP (ref 1.8–7.4)
NEUTROPHILS NFR BLD AUTO: 61.4 % — SIGNIFICANT CHANGE UP (ref 43–77)
NRBC # BLD: 0 /100 WBCS — SIGNIFICANT CHANGE UP (ref 0–0)
PLATELET # BLD AUTO: 201 K/UL — SIGNIFICANT CHANGE UP (ref 150–400)
POTASSIUM SERPL-MCNC: 4 MMOL/L — SIGNIFICANT CHANGE UP (ref 3.5–5.3)
POTASSIUM SERPL-SCNC: 4 MMOL/L — SIGNIFICANT CHANGE UP (ref 3.5–5.3)
PROT SERPL-MCNC: 7 G/DL — SIGNIFICANT CHANGE UP (ref 6–8.3)
RBC # BLD: 4.26 M/UL — SIGNIFICANT CHANGE UP (ref 4.2–5.8)
RBC # FLD: 13 % — SIGNIFICANT CHANGE UP (ref 10.3–14.5)
SODIUM SERPL-SCNC: 135 MMOL/L — SIGNIFICANT CHANGE UP (ref 135–145)
WBC # BLD: 8.53 K/UL — SIGNIFICANT CHANGE UP (ref 3.8–10.5)
WBC # FLD AUTO: 8.53 K/UL — SIGNIFICANT CHANGE UP (ref 3.8–10.5)

## 2019-08-21 PROCEDURE — 99285 EMERGENCY DEPT VISIT HI MDM: CPT

## 2019-08-21 PROCEDURE — 74019 RADEX ABDOMEN 2 VIEWS: CPT | Mod: 26

## 2019-08-21 PROCEDURE — 99238 HOSP IP/OBS DSCHRG MGMT 30/<: CPT

## 2019-08-21 RX ORDER — DEXTROSE 50 % IN WATER 50 %
25 SYRINGE (ML) INTRAVENOUS ONCE
Refills: 0 | Status: DISCONTINUED | OUTPATIENT
Start: 2019-08-21 | End: 2019-08-21

## 2019-08-21 RX ORDER — FAMOTIDINE 10 MG/ML
20 INJECTION INTRAVENOUS
Refills: 0 | Status: DISCONTINUED | OUTPATIENT
Start: 2019-08-21 | End: 2019-08-21

## 2019-08-21 RX ORDER — HEPARIN SODIUM 5000 [USP'U]/ML
5000 INJECTION INTRAVENOUS; SUBCUTANEOUS EVERY 8 HOURS
Refills: 0 | Status: DISCONTINUED | OUTPATIENT
Start: 2019-08-21 | End: 2019-08-21

## 2019-08-21 RX ORDER — SODIUM CHLORIDE 9 MG/ML
1000 INJECTION, SOLUTION INTRAVENOUS
Refills: 0 | Status: DISCONTINUED | OUTPATIENT
Start: 2019-08-21 | End: 2019-08-21

## 2019-08-21 RX ORDER — DEXTROSE 50 % IN WATER 50 %
15 SYRINGE (ML) INTRAVENOUS ONCE
Refills: 0 | Status: DISCONTINUED | OUTPATIENT
Start: 2019-08-21 | End: 2019-08-21

## 2019-08-21 RX ORDER — GLUCAGON INJECTION, SOLUTION 0.5 MG/.1ML
1 INJECTION, SOLUTION SUBCUTANEOUS ONCE
Refills: 0 | Status: DISCONTINUED | OUTPATIENT
Start: 2019-08-21 | End: 2019-08-21

## 2019-08-21 RX ORDER — INSULIN LISPRO 100/ML
VIAL (ML) SUBCUTANEOUS
Refills: 0 | Status: DISCONTINUED | OUTPATIENT
Start: 2019-08-21 | End: 2019-08-21

## 2019-08-21 RX ORDER — DEXTROSE 50 % IN WATER 50 %
12.5 SYRINGE (ML) INTRAVENOUS ONCE
Refills: 0 | Status: DISCONTINUED | OUTPATIENT
Start: 2019-08-21 | End: 2019-08-21

## 2019-08-21 RX ADMIN — Medication 1.25 MILLIGRAM(S): at 17:07

## 2019-08-21 RX ADMIN — HEPARIN SODIUM 5000 UNIT(S): 5000 INJECTION INTRAVENOUS; SUBCUTANEOUS at 07:11

## 2019-08-21 RX ADMIN — FAMOTIDINE 20 MILLIGRAM(S): 10 INJECTION INTRAVENOUS at 07:11

## 2019-08-21 RX ADMIN — Medication 1.25 MILLIGRAM(S): at 11:16

## 2019-08-21 RX ADMIN — FAMOTIDINE 20 MILLIGRAM(S): 10 INJECTION INTRAVENOUS at 17:24

## 2019-08-21 RX ADMIN — Medication 1.25 MILLIGRAM(S): at 07:11

## 2019-08-21 RX ADMIN — SODIUM CHLORIDE 100 MILLILITER(S): 9 INJECTION, SOLUTION INTRAVENOUS at 04:25

## 2019-08-21 RX ADMIN — SODIUM CHLORIDE 100 MILLILITER(S): 9 INJECTION, SOLUTION INTRAVENOUS at 10:00

## 2019-08-21 NOTE — PROGRESS NOTE ADULT - SUBJECTIVE AND OBJECTIVE BOX
70 y/o male admitted with questionable SBo. Patient examined at bedside, no complaints.   No nausea, no vomiting  + flatus and bm     T(F): 98 (08-21-19 @ 07:19), Max: 98 (08-21-19 @ 07:19)  HR: 64 (08-21-19 @ 07:19) (59 - 64)  BP: 139/70 (08-21-19 @ 07:19) (131/86 - 148/87)  RR: 18 (08-21-19 @ 07:19) (16 - 18)  SpO2: 100% (08-21-19 @ 07:19) (99% - 100%)  Wt(kg): --                          13.6   8.53  )-----------( 201      ( 21 Aug 2019 01:00 )             40.0   08-21    135  |  99  |  17  ----------------------------<  127<H>  4.0   |  28  |  1.21    Ca    9.0      21 Aug 2019 01:00    TPro  7.0  /  Alb  3.8  /  TBili  0.6  /  DBili  x   /  AST  29  /  ALT  38  /  AlkPhos  73  08-21          Physical Exam  General: AAOx3, No acute distress  Skin: No jaundice, no icterus  Abdomen: soft, nontender, nondistended, no rebound tenderness, no guarding, no palpable masses  : Normal external genitalia  Extremities: non edematous, no calf pain bilaterally

## 2019-08-21 NOTE — ED PROVIDER NOTE - PMH
Liver abscess  x10 years ago  Prostate cancer  Last month diagnosis(July 2019)  SBO (small bowel obstruction)  Partial SBO January 2019  SBO (small bowel obstruction)  x6 years ago

## 2019-08-21 NOTE — ED PROVIDER NOTE - CPE EDP [facility] NORM
normal... present and normal in 4 extremities/normal sensation to light touch and pinprick to entire body, including affected extremities

## 2019-08-21 NOTE — ED PROVIDER NOTE - OBJECTIVE STATEMENT
68 y/o M patient w/ PMHx of Liver abscess x10 years ago, SBO x6 years ago, Partial SBO this January, Prostate cancer(Diagnosed last month), and 2 MRN's presents to the ED w/ abd cramping that began x3 days agos. Patient states he went for his routine CT today for METs but found to have accidental finding for partial SBO. Patient notes he has been having normal bowel movements. Patient denies nausea, fever, chills, hematuria, passing flatus, or any other complaints. NKDA.

## 2019-08-21 NOTE — H&P ADULT - HISTORY OF PRESENT ILLNESS
68 y/o man with PMH of DM, HTN, HLD, recently diagnosed prostate ca, s/p exp lap and drainage of liver abscess about 10 years ago c/o some mild crampy pain in abdomen for last 3-4 days. Pain mild, intermittent in periumbilical area. Pt denies N/V, fever/chills, CP, SOB, dizziness, dysuria. Pt has been tolerating diet and having BM's.  Pt went to [facility] yesterday for prostate ca evaluation and had CT abd/pel there. Pt was informed by his PMD last evening that CT abd/pel shows dilated loops of bowwl c/w partial Small bowel obstruction. Pt opted to come to [facility] sine he has been here [facility] for 2-3 episodes of Small bowel obstruction in past, most recent one in Jan, 2019.  Of note, Pt has original MR # [id number]

## 2019-08-21 NOTE — DISCHARGE NOTE PROVIDER - HOSPITAL COURSE
70 y/o man with PMH of DM, HTN, HLD, recently diagnosed prostate ca, s/p exp lap and drainage of liver abscess about 10 years ago c/o some mild crampy pain in abdomen for last 3-4 days. Pain mild, intermittent in periumbilical area. Pt denies N/V, fever/chills, CP, SOB, dizziness, dysuria. Pt has been tolerating diet and having BM's.    Pt went to [facility] yesterday for prostate ca evaluation and had CT abd/pel there. Pt was informed by his PMD last evening that CT abd/pel shows dilated loops of bowwl c/w partial Small bowel obstruction. Pt opted to come to [facility] sine he has been here [facility] for 2-3 episodes of Small bowel obstruction in past, most recent one in Jan, 2019    Patient clinically did not show signs of SBO, having flatus and bm. No abd pain. Diet was advanced and tolerated     Patient for d/c home

## 2019-08-21 NOTE — ED PROVIDER NOTE - CLINICAL SUMMARY MEDICAL DECISION MAKING FREE TEXT BOX
68 y/o M patient presents to the ED w/ abd cramping. Called [facility] twice for CT result. Surgery consulted. Surgery recommends X-ray.

## 2019-08-21 NOTE — DISCHARGE NOTE NURSING/CASE MANAGEMENT/SOCIAL WORK - NSDCDPATPORTLINK_GEN_ALL_CORE
You can access the [facility] Patient Portal, offered by [facility], by registering with the following website: [email or web address]

## 2019-08-21 NOTE — ED ADULT NURSE NOTE - OBJECTIVE STATEMENT
Patient is a 69 y.o male presenting for abdominal pain after referral from PCP. As per patient, he had a CT scan done earlier in the day at [facility]  following dx of prostate CA 2 weeks ago. He was referred for possible bowel obstruction.

## 2019-08-21 NOTE — ED ADULT NURSE REASSESSMENT NOTE - NS ED NURSE REASSESS COMMENT FT1
Pt received in bed aox4, no signs of distress [facility] Pt [facility] unit at 0800, via stretcher.

## 2019-08-21 NOTE — PROGRESS NOTE ADULT - ASSESSMENT
70 y/o male admitted with questionable SBo on outside imagining     1. clears to regular diet   2. d/c if tolerates diet

## 2019-08-21 NOTE — H&P ADULT - PROBLEM SELECTOR PLAN 1
Admit to Surgery/Dr Wen  NPO, IVF  Serial abdominal exams  AM labs  GI/DVT prophylaxis Admit to Surgery/Dr Wen  NPO, IVF  Serial abdominal exams  AM labs  GI/DVT prophylaxis  D/W Dr Wen

## 2019-08-21 NOTE — DISCHARGE NOTE PROVIDER - CARE PROVIDER_API CALL
Shakeel Wen)  Surgery  [address], [place] [address]  Phone: [phone]  Fax: [phone]  Follow Up Time: Routine

## 2019-08-21 NOTE — H&P ADULT - NSHPLABSRESULTS_GEN_ALL_CORE
13.6   8.53  )-----------( 201      ( 21 Aug 2019 01:00 )             40.0     08-21    135  |  99  |  17  ----------------------------<  127<H>  4.0   |  28  |  1.21    Ca    9.0      21 Aug 2019 01:00    TPro  7.0  /  Alb  3.8  /  TBili  0.6  /  DBili  x   /  AST  29  /  ALT  38  /  AlkPhos  73  08-21    AXR: dilated loops of bowel, contrast in rectum

## 2019-08-21 NOTE — H&P ADULT - NSHPSOCIALHISTORY_GEN_ALL_CORE
Single, lives by himself   at [facility]  Denies tobacco  Social EtOH  Heterosexual

## 2019-08-21 NOTE — ED ADULT NURSE NOTE - CAS EDN DISCHARGE ASSESSMENT
Alert and oriented to person, place and time Alert and oriented to person, place and time/Awake/Symptoms improved

## 2019-08-21 NOTE — H&P ADULT - NSHPPHYSICALEXAM_GEN_ALL_CORE
Vital Signs Last 24 Hrs  T(C): 36.3 (20 Aug 2019 22:54), Max: 36.3 (20 Aug 2019 22:54)  T(F): 97.4 (20 Aug 2019 22:54), Max: 97.4 (20 Aug 2019 22:54)  HR: 62 (20 Aug 2019 22:54) (62 - 62)  BP: 131/86 (20 Aug 2019 22:54) (131/86 - 131/86)  BP(mean): --  RR: 17 (20 Aug 2019 22:54) (17 - 17)  SpO2: 100% (20 Aug 2019 22:54) (100% - 100%)

## 2019-08-21 NOTE — H&P ADULT - NSICDXPASTMEDICALHX_GEN_ALL_CORE_FT
PAST MEDICAL HISTORY:  Liver abscess x10 years ago    Prostate cancer Last month diagnosis(July 2019)    SBO (small bowel obstruction) x6 years ago    SBO (small bowel obstruction) Partial SBO January 2019

## 2019-08-21 NOTE — PROGRESS NOTE ADULT - SUBJECTIVE AND OBJECTIVE BOX
INTERVAL HPI/OVERNIGHT EVENTS:  Pt stable.   Tolerating diet.   flatus/BM.    Vital Signs Last 24 Hrs  T(C): 36.8 (21 Aug 2019 10:47), Max: 36.8 (21 Aug 2019 10:47)  T(F): 98.3 (21 Aug 2019 10:47), Max: 98.3 (21 Aug 2019 10:47)  HR: 59 (21 Aug 2019 10:47) (59 - 64)  BP: 120/77 (21 Aug 2019 10:47) (120/77 - 148/87)  BP(mean): --  RR: 18 (21 Aug 2019 10:47) (16 - 18)  SpO2: 99% (21 Aug 2019 10:47) (99% - 100%)    Physical:  Abdomen: Soft nondistended, nontender.  Incisional hernia reducible  No complaints    I&O's Summary      LABS:                        13.6   8.53  )-----------( 201      ( 21 Aug 2019 01:00 )             40.0             08-21    135  |  99  |  17  ----------------------------<  127<H>  4.0   |  28  |  1.21    Ca    9.0      21 Aug 2019 01:00    TPro  7.0  /  Alb  3.8  /  TBili  0.6  /  DBili  x   /  AST  29  /  ALT  38  /  AlkPhos  73  08-21

## 2019-08-21 NOTE — DISCHARGE NOTE PROVIDER - NSDCCPCAREPLAN_GEN_ALL_CORE_FT
PRINCIPAL DISCHARGE DIAGNOSIS  Diagnosis: Small bowel obstruction  Assessment and Plan of Treatment: Resolved   Please follow up with Dr. Wen as needed

## 2019-10-03 ENCOUNTER — APPOINTMENT (OUTPATIENT)
Dept: ENDOCRINOLOGY | Facility: CLINIC | Age: 69
End: 2019-10-03
Payer: COMMERCIAL

## 2019-10-03 VITALS
SYSTOLIC BLOOD PRESSURE: 108 MMHG | BODY MASS INDEX: 27.31 KG/M2 | DIASTOLIC BLOOD PRESSURE: 73 MMHG | TEMPERATURE: 98.3 F | HEIGHT: 67 IN | HEART RATE: 72 BPM | WEIGHT: 174 LBS | OXYGEN SATURATION: 97 % | RESPIRATION RATE: 16 BRPM

## 2019-10-03 DIAGNOSIS — C61 MALIGNANT NEOPLASM OF PROSTATE: ICD-10-CM

## 2019-10-03 PROCEDURE — 99214 OFFICE O/P EST MOD 30 MIN: CPT

## 2019-10-03 NOTE — DATA REVIEWED
[FreeTextEntry1] : The FBS and hbA1c are slightly elevated. The renal function is fine. The lipid panel is good.

## 2019-10-03 NOTE — HISTORY OF PRESENT ILLNESS
[FreeTextEntry1] : Patient feels well , he is asymptomatic. His weight has not changed. He is exercising regularly and following the diet. His blood glucose at home are well controlled, they are usually around 125 mg/dl. The FBS in the laboratory was 143 mg/dl and the HbA1c was 7.1 %, increased since last visit. The renal function is fine, the microalbumin in the urine was normal. The lipid panel was normal. He denies low blood glucose during the night. He denies chest pain, SOB, denies numbness and tingling sensation and burning sensation on extremities. Taking his medications regularly. He has seen the Ophthalmologist recently. He has not seen Podiatrist recently. He has seen the Cardiologist recently, he had an stress test.\par

## 2019-10-24 ENCOUNTER — MEDICATION RENEWAL (OUTPATIENT)
Age: 69
End: 2019-10-24

## 2019-10-26 ENCOUNTER — RX RENEWAL (OUTPATIENT)
Age: 69
End: 2019-10-26

## 2019-11-23 ENCOUNTER — TRANSCRIPTION ENCOUNTER (OUTPATIENT)
Age: 69
End: 2019-11-23

## 2019-11-26 ENCOUNTER — RX RENEWAL (OUTPATIENT)
Age: 69
End: 2019-11-26

## 2020-02-11 ENCOUNTER — APPOINTMENT (OUTPATIENT)
Dept: ENDOCRINOLOGY | Facility: CLINIC | Age: 70
End: 2020-02-11
Payer: COMMERCIAL

## 2020-02-11 VITALS
DIASTOLIC BLOOD PRESSURE: 77 MMHG | TEMPERATURE: 97.6 F | WEIGHT: 175 LBS | BODY MASS INDEX: 27.47 KG/M2 | OXYGEN SATURATION: 98 % | HEART RATE: 68 BPM | SYSTOLIC BLOOD PRESSURE: 105 MMHG | RESPIRATION RATE: 16 BRPM | HEIGHT: 67 IN

## 2020-02-11 PROCEDURE — 99214 OFFICE O/P EST MOD 30 MIN: CPT

## 2020-02-11 RX ORDER — SIMVASTATIN 10 MG/1
10 TABLET, FILM COATED ORAL DAILY
Qty: 90 | Refills: 0 | Status: ACTIVE | COMMUNITY
Start: 2017-11-13

## 2020-02-11 RX ORDER — CIPROFLOXACIN HYDROCHLORIDE 500 MG/1
500 TABLET, FILM COATED ORAL
Qty: 10 | Refills: 0 | Status: ACTIVE | COMMUNITY
Start: 2018-02-08

## 2020-02-11 RX ORDER — GLIMEPIRIDE 1 MG/1
1 TABLET ORAL
Qty: 90 | Refills: 3 | Status: COMPLETED | COMMUNITY
Start: 2018-05-10 | End: 2020-02-11

## 2020-02-11 RX ORDER — METOPROLOL SUCCINATE 100 MG/1
100 TABLET, EXTENDED RELEASE ORAL DAILY
Refills: 0 | Status: COMPLETED | COMMUNITY
End: 2020-02-11

## 2020-02-11 RX ORDER — SITAGLIPTIN AND METFORMIN HYDROCHLORIDE 50; 500 MG/1; MG/1
50-500 TABLET, FILM COATED, EXTENDED RELEASE ORAL
Qty: 180 | Refills: 3 | Status: COMPLETED | COMMUNITY
Start: 2018-05-10 | End: 2020-02-11

## 2020-02-11 NOTE — ASSESSMENT
[FreeTextEntry1] : The diabetes has deteriorated slightly\par Will discontinue the Janumet and Glimepiride\par Will start metformin and Glipizide\par Advised to follow the diet and exercise\par He says that he may need the prednisone for one year

## 2020-02-11 NOTE — PHYSICAL EXAM
[Alert] : alert [No Acute Distress] : no acute distress [Normal Sclera/Conjunctiva] : normal sclera/conjunctiva [PERRL] : pupils equal, round and reactive to light [No Neck Mass] : no neck mass was observed [Normal Hearing] : hearing was normal [Normal Outer Ear/Nose] : the ears and nose were normal in appearance [Normal Rate and Effort] : normal respiratory rhythm and effort [No Respiratory Distress] : no respiratory distress [Thyroid Not Enlarged] : the thyroid was not enlarged [Normal PMI] : the apical impulse was normal [Normal Rate] : heart rate was normal  [Carotids Normal] : carotid pulses were normal with no bruits [Pedal Pulses Normal] : the pedal pulses are present [No Stigmata of Cushings Syndrome] : no stigmata of cushings syndrome [No Rash] : no rash [No Clubbing, Cyanosis] : no clubbing  or cyanosis of the fingernails [Normal Gait] : normal gait [No Motor Deficits] : the motor exam was normal [No Skin Lesions] : no skin lesions [Normal Sensation on Monofilament Testing] : normal sensation on monofilament testing of lower extremities [No Sensory Deficits] : the sensory exam was normal to light touch and pinprick

## 2020-06-12 ENCOUNTER — APPOINTMENT (OUTPATIENT)
Dept: ENDOCRINOLOGY | Facility: CLINIC | Age: 70
End: 2020-06-12
Payer: COMMERCIAL

## 2020-06-12 PROCEDURE — 99443: CPT

## 2020-06-12 NOTE — DATA REVIEWED
[FreeTextEntry1] : The FBS and hbA1c have deteriorated. The renal function is fine. The thyroid tests are fine

## 2020-06-12 NOTE — HISTORY OF PRESENT ILLNESS
[Home] : at home, [unfilled] , at the time of the visit. [Medical Office: ([facility])___] : at the medical office located in  [Verbal consent obtained from patient] : the patient, [unfilled] [FreeTextEntry1] : Patient feels well , he is asymptomatic. His weight has increased. He is exercising regularly and following the diet. His blood glucose at home are not well controlled. The FBS in the laboratory was 107 mg/dl and the HbA1c was 7.6 %. The renal function is within normal limits, the microalbumin in the urine was normal. The lipid panel was within normal limits. He denies low blood glucose during the night. He denies chest pain, or SOB. Denies numbness, tingling or burning sensation on his extremities. Taking his medications regularly. He has not seen the Ophthalmologist recently. He has not seen Podiatrist recently. \par

## 2020-06-12 NOTE — ASSESSMENT
[FreeTextEntry1] : The diabetic control has deteriorated\par He has gained weight\par He is home inactive not following the diet\par Will raise the Metformin dose to 500 AM plus 1000 mg po PM\par Advised to exercise and follow the diet\par Will continue the same treatment

## 2020-08-06 ENCOUNTER — TRANSCRIPTION ENCOUNTER (OUTPATIENT)
Age: 70
End: 2020-08-06

## 2020-08-07 ENCOUNTER — TRANSCRIPTION ENCOUNTER (OUTPATIENT)
Age: 70
End: 2020-08-07

## 2020-08-11 ENCOUNTER — TRANSCRIPTION ENCOUNTER (OUTPATIENT)
Age: 70
End: 2020-08-11

## 2020-08-14 RX ORDER — BLOOD SUGAR DIAGNOSTIC
STRIP MISCELLANEOUS 3 TIMES DAILY
Qty: 300 | Refills: 3 | Status: COMPLETED | COMMUNITY
Start: 2017-11-17 | End: 2020-08-14

## 2020-08-14 RX ORDER — BLOOD-GLUCOSE METER
W/DEVICE EACH MISCELLANEOUS
Qty: 1 | Refills: 0 | Status: COMPLETED | COMMUNITY
Start: 2017-11-17 | End: 2020-08-14

## 2020-08-18 ENCOUNTER — TRANSCRIPTION ENCOUNTER (OUTPATIENT)
Age: 70
End: 2020-08-18

## 2020-08-20 ENCOUNTER — TRANSCRIPTION ENCOUNTER (OUTPATIENT)
Age: 70
End: 2020-08-20

## 2020-10-19 ENCOUNTER — TRANSCRIPTION ENCOUNTER (OUTPATIENT)
Age: 70
End: 2020-10-19

## 2020-10-20 ENCOUNTER — APPOINTMENT (OUTPATIENT)
Dept: ENDOCRINOLOGY | Facility: CLINIC | Age: 70
End: 2020-10-20
Payer: COMMERCIAL

## 2020-10-20 VITALS
WEIGHT: 180 LBS | HEART RATE: 80 BPM | HEIGHT: 67 IN | DIASTOLIC BLOOD PRESSURE: 69 MMHG | OXYGEN SATURATION: 97 % | BODY MASS INDEX: 28.25 KG/M2 | SYSTOLIC BLOOD PRESSURE: 107 MMHG | RESPIRATION RATE: 16 BRPM | TEMPERATURE: 97.1 F

## 2020-10-20 PROCEDURE — 99072 ADDL SUPL MATRL&STAF TM PHE: CPT

## 2020-10-20 PROCEDURE — 99214 OFFICE O/P EST MOD 30 MIN: CPT | Mod: 25

## 2020-10-20 NOTE — ASSESSMENT
[FreeTextEntry1] : The diabetes is well controlled\par He has lost weight\par He is following the diet\par He is trying to walk regularly\par Will continue same treatment\par

## 2020-10-20 NOTE — HISTORY OF PRESENT ILLNESS
[FreeTextEntry1] : Patient feels well , he is asymptomatic. His weight has increased. He is not exercising regularly but he is following the diet. His blood glucose at home are well controlled, they are usually around 90 mg/dl. The FBS in the laboratory was 140 mg/dl and the HbA1c was 6.8 % improved from 7.1%. The renal function is within normal limits. The lipid panel was within normal limits. He denies low blood glucose during the night. He denies chest pain, or SOB. Denies numbness, tingling or burning sensation on his extremities. Taking his medications regularly. He has not/seen the Ophthalmologist recently. He has not/seen Podiatrist recently. He has not/seen the Cardiologist recently.\par

## 2020-12-30 ENCOUNTER — TRANSCRIPTION ENCOUNTER (OUTPATIENT)
Age: 70
End: 2020-12-30

## 2020-12-30 RX ORDER — LINAGLIPTIN 5 MG/1
5 TABLET, FILM COATED ORAL
Qty: 90 | Refills: 3 | Status: COMPLETED | COMMUNITY
Start: 2019-10-03 | End: 2020-12-30

## 2021-01-04 ENCOUNTER — TRANSCRIPTION ENCOUNTER (OUTPATIENT)
Age: 71
End: 2021-01-04

## 2021-02-03 ENCOUNTER — TRANSCRIPTION ENCOUNTER (OUTPATIENT)
Age: 71
End: 2021-02-03

## 2021-02-04 ENCOUNTER — APPOINTMENT (OUTPATIENT)
Dept: ENDOCRINOLOGY | Facility: CLINIC | Age: 71
End: 2021-02-04
Payer: COMMERCIAL

## 2021-02-04 VITALS
HEIGHT: 67 IN | TEMPERATURE: 97.8 F | DIASTOLIC BLOOD PRESSURE: 68 MMHG | RESPIRATION RATE: 16 BRPM | SYSTOLIC BLOOD PRESSURE: 104 MMHG | HEART RATE: 74 BPM | BODY MASS INDEX: 28.25 KG/M2 | OXYGEN SATURATION: 98 % | WEIGHT: 180 LBS

## 2021-02-04 PROCEDURE — 99214 OFFICE O/P EST MOD 30 MIN: CPT

## 2021-02-04 PROCEDURE — 99072 ADDL SUPL MATRL&STAF TM PHE: CPT

## 2021-02-04 NOTE — DATA REVIEWED
[FreeTextEntry1] : The FBS and hba1c have increased, his weight is stable. The renal function is fine. the lipid panel is fine.

## 2021-02-04 NOTE — ASSESSMENT
[FreeTextEntry1] : The diabetic control has deteriorated slightly\par Advised to follow the diet and exercise\par Will continue same treatment except the Januvia will be increase to 100 mg po QD\par When the HbA1c was below 7% he was on Tradjenta 5 mg po QD\par Will repeat the blood test in 4 months

## 2021-02-04 NOTE — HISTORY OF PRESENT ILLNESS
[FreeTextEntry1] : Patient feels well , he is asymptomatic. His weight has not changed. He is not exercising regularly, he is following the diet. His blood glucose at home are well controlled, they are usually around 100 mg/dl. The FBS in the laboratory was 129 mg/dl and the HbA1c was 7 %. The renal function is within normal limits, the microalbumin in the urine was normal. The lipid panel was within normal limits. He denies low blood glucose during the night. He denies chest pain, or SOB. Denies numbness, tingling or burning sensation on his extremities. Taking his medications regularly. He has seen the Ophthalmologist recently. He has not seen Podiatrist recently. He has seen the Cardiologist recently.\par

## 2021-02-07 RX ORDER — GLIPIZIDE 5 MG/1
5 TABLET ORAL
Qty: 180 | Refills: 3 | Status: COMPLETED | COMMUNITY
Start: 2020-02-11 | End: 2021-02-07

## 2021-02-09 RX ORDER — GLIPIZIDE 5 MG/1
5 TABLET, FILM COATED, EXTENDED RELEASE ORAL
Qty: 180 | Refills: 3 | Status: COMPLETED | COMMUNITY
Start: 2021-02-07 | End: 2021-02-09

## 2021-02-12 ENCOUNTER — TRANSCRIPTION ENCOUNTER (OUTPATIENT)
Age: 71
End: 2021-02-12

## 2021-03-30 ENCOUNTER — TRANSCRIPTION ENCOUNTER (OUTPATIENT)
Age: 71
End: 2021-03-30

## 2021-04-01 ENCOUNTER — TRANSCRIPTION ENCOUNTER (OUTPATIENT)
Age: 71
End: 2021-04-01

## 2021-04-01 RX ORDER — HYDROCHLOROTHIAZIDE 25 MG/1
25 TABLET ORAL DAILY
Qty: 90 | Refills: 3 | Status: ACTIVE | COMMUNITY
Start: 2018-02-23 | End: 1900-01-01

## 2021-06-08 ENCOUNTER — APPOINTMENT (OUTPATIENT)
Dept: ENDOCRINOLOGY | Facility: CLINIC | Age: 71
End: 2021-06-08
Payer: COMMERCIAL

## 2021-06-08 VITALS
BODY MASS INDEX: 27.94 KG/M2 | WEIGHT: 178 LBS | RESPIRATION RATE: 16 BRPM | HEIGHT: 67 IN | HEART RATE: 61 BPM | TEMPERATURE: 96.5 F | OXYGEN SATURATION: 97 % | DIASTOLIC BLOOD PRESSURE: 74 MMHG | SYSTOLIC BLOOD PRESSURE: 119 MMHG

## 2021-06-08 PROCEDURE — 99214 OFFICE O/P EST MOD 30 MIN: CPT

## 2021-06-08 NOTE — ASSESSMENT
[FreeTextEntry1] : The diabetes has deteriorated slightly\par Will increase metformin 1g po BID\par Advised to follow the diet and walk regularly\par Advised to get Glucose tablets\par Will repeat the blood tests before next visit

## 2021-06-08 NOTE — DATA REVIEWED
[FreeTextEntry1] : The FBS and HbA1c have deteriorated. The thyroid tests were normal. The Vitamin B12 was normal.

## 2021-06-08 NOTE — HISTORY OF PRESENT ILLNESS
[FreeTextEntry1] : Patient feels well , he is asymptomatic. His weight has decreased. He is walking regularly and following the diet. His blood glucose at home are well controlled. The FBS in the laboratory was 101 mg/dl and the HbA1c was 7.2 % increased since last visit. The renal function is within normal limits, the microalbumin in the urine was normal. The lipid panel was within normal limits. He denies low blood glucose during the night. He denies chest pain, or SOB. Denies numbness, tingling or burning sensation on his extremities. Taking his medications regularly.\par

## 2021-09-21 ENCOUNTER — TRANSCRIPTION ENCOUNTER (OUTPATIENT)
Age: 71
End: 2021-09-21

## 2021-10-07 ENCOUNTER — TRANSCRIPTION ENCOUNTER (OUTPATIENT)
Age: 71
End: 2021-10-07

## 2021-10-19 ENCOUNTER — APPOINTMENT (OUTPATIENT)
Dept: ENDOCRINOLOGY | Facility: CLINIC | Age: 71
End: 2021-10-19
Payer: COMMERCIAL

## 2021-10-19 VITALS
HEART RATE: 62 BPM | HEIGHT: 67 IN | BODY MASS INDEX: 27.62 KG/M2 | DIASTOLIC BLOOD PRESSURE: 85 MMHG | WEIGHT: 176 LBS | OXYGEN SATURATION: 96 % | RESPIRATION RATE: 16 BRPM | SYSTOLIC BLOOD PRESSURE: 133 MMHG | TEMPERATURE: 97.3 F

## 2021-10-19 PROCEDURE — 99214 OFFICE O/P EST MOD 30 MIN: CPT

## 2021-10-19 RX ORDER — BLOOD SUGAR DIAGNOSTIC
STRIP MISCELLANEOUS
Qty: 270 | Refills: 1 | Status: ACTIVE | COMMUNITY
Start: 2020-08-06 | End: 1900-01-01

## 2021-10-19 NOTE — DATA REVIEWED
[FreeTextEntry1] : The FBS and hbA1c have improved. The renal function is fine. The lipid panel is fine.

## 2021-10-19 NOTE — ASSESSMENT
[FreeTextEntry1] : The diabetes is well controlled\par He has lost weight\par The hyperlipidemia is well controlled\par He is clinically euthyroid\par Will continue the same treatment\par The medications were renewed

## 2021-10-19 NOTE — HISTORY OF PRESENT ILLNESS
[FreeTextEntry1] : Patient feels well , he is asymptomatic. His weight has decreased. He is walking regularly and following the diet. His blood glucose at home are not/well controlled, they are usually around 105 mg/dl. The FBS in the laboratory was 121 mg/dl and the HbA1c was 6.6 %. The renal function is within normal limits, the microalbumin in the urine was normal. The lipid panel was within normal limits. He denies low blood glucose during the night. He denies chest pain, or SOB. Denies numbness, tingling or burning sensation on his extremities. Taking his medications regularly. He has seen the Ophthalmologist recently. He has not seen Podiatrist recently. He has seen the Cardiologist recently.\par

## 2021-10-19 NOTE — PHYSICAL EXAM
[Alert] : alert [No Acute Distress] : no acute distress [No Neck Mass] : no neck mass was observed [Thyroid Not Enlarged] : the thyroid was not enlarged [No Respiratory Distress] : no respiratory distress [Clear to Auscultation] : lungs were clear to auscultation bilaterally [Normal PMI] : the apical impulse was normal [Normal Rate] : heart rate was normal Mohs Method Verbiage: The operative site was curetted for margins and a beveled incision following the standard Mohs approach was done.  The specimen was harvested as a microscopic controlled layer and a map of the extirpated tissue was made for orientation.

## 2022-04-03 ENCOUNTER — TRANSCRIPTION ENCOUNTER (OUTPATIENT)
Age: 72
End: 2022-04-03

## 2022-05-03 ENCOUNTER — APPOINTMENT (OUTPATIENT)
Dept: ENDOCRINOLOGY | Facility: CLINIC | Age: 72
End: 2022-05-03
Payer: COMMERCIAL

## 2022-05-03 VITALS
TEMPERATURE: 96.3 F | SYSTOLIC BLOOD PRESSURE: 142 MMHG | HEIGHT: 67 IN | DIASTOLIC BLOOD PRESSURE: 72 MMHG | RESPIRATION RATE: 16 BRPM | OXYGEN SATURATION: 98 % | HEART RATE: 64 BPM | BODY MASS INDEX: 27.47 KG/M2 | WEIGHT: 175 LBS

## 2022-05-03 PROCEDURE — 99214 OFFICE O/P EST MOD 30 MIN: CPT

## 2022-05-03 NOTE — HISTORY OF PRESENT ILLNESS
[FreeTextEntry1] : Patient feels well , he is asymptomatic. His weight has not changed. He is exercising regularly and following the diet. His blood glucose at home are well controlled, they are usually around 110 mg/dl. The FBS in the laboratory was 101 mg/dl and the HbA1c was 6.5 %. The renal function is within normal limits, the microalbumin in the urine was abnormal. The lipid panel was within normal limits. He denies low blood glucose during the night. He denies chest pain, or SOB. Denies numbness, tingling or burning sensation on his extremities. Taking his medications regularly. \par

## 2022-05-03 NOTE — ASSESSMENT
[FreeTextEntry1] : The diabetes is well controlled\par His weight is stable\par He has small amount of albumin in the urine\par He will be started on Lisinopril 2.5 mg po QD\par Will continue the same treatment

## 2022-05-03 NOTE — DATA REVIEWED
[FreeTextEntry1] : The FBS and HbA1c indicates good diabetic control. His renal function is fine. he has small amount of palbumin in the urine.

## 2022-06-20 ENCOUNTER — RX RENEWAL (OUTPATIENT)
Age: 72
End: 2022-06-20

## 2022-08-17 RX ORDER — ATORVASTATIN CALCIUM 80 MG/1
1 TABLET, FILM COATED ORAL
Qty: 0 | Refills: 0 | DISCHARGE

## 2022-08-17 RX ORDER — SITAGLIPTIN AND METFORMIN HYDROCHLORIDE 500; 50 MG/1; MG/1
1 TABLET, FILM COATED ORAL
Qty: 0 | Refills: 0 | DISCHARGE

## 2022-08-17 RX ORDER — METOPROLOL TARTRATE 50 MG
1 TABLET ORAL
Qty: 0 | Refills: 0 | DISCHARGE

## 2022-11-11 NOTE — ED ADULT NURSE NOTE - NSSISCREENINGQ5_ED_A_ED
Mira /Nurse nicolas Human  Is patient still suppose to be taking Atorvastatin  If so contact the patient He hasn't filled it since July 15th   Any questions Please call [phone]  Ex [id number]   No

## 2022-11-18 PROBLEM — K56.609 UNSPECIFIED INTESTINAL OBSTRUCTION, UNSPECIFIED AS TO PARTIAL VERSUS COMPLETE OBSTRUCTION: Chronic | Status: ACTIVE | Noted: 2019-08-21

## 2022-11-18 PROBLEM — C61 MALIGNANT NEOPLASM OF PROSTATE: Chronic | Status: ACTIVE | Noted: 2019-08-21

## 2022-11-18 PROBLEM — K75.0 ABSCESS OF LIVER: Chronic | Status: ACTIVE | Noted: 2019-08-21

## 2023-02-28 ENCOUNTER — APPOINTMENT (OUTPATIENT)
Dept: ENDOCRINOLOGY | Facility: CLINIC | Age: 73
End: 2023-02-28
Payer: COMMERCIAL

## 2023-02-28 VITALS
RESPIRATION RATE: 16 BRPM | BODY MASS INDEX: 26.68 KG/M2 | HEART RATE: 64 BPM | OXYGEN SATURATION: 99 % | HEIGHT: 67 IN | WEIGHT: 170 LBS | SYSTOLIC BLOOD PRESSURE: 123 MMHG | DIASTOLIC BLOOD PRESSURE: 82 MMHG | TEMPERATURE: 97.3 F

## 2023-02-28 PROCEDURE — 99214 OFFICE O/P EST MOD 30 MIN: CPT

## 2023-03-01 NOTE — PATIENT PROFILE ADULT - HOME ACCESSIBILITY CONCERNS
Fluconazole Counseling:  Patient counseled regarding adverse effects of fluconazole including but not limited to headache, diarrhea, nausea, upset stomach, liver function test abnormalities, taste disturbance, and stomach pain.  There is a rare possibility of liver failure that can occur when taking fluconazole.  The patient understands that monitoring of LFTs and kidney function test may be required, especially at baseline. The patient verbalized understanding of the proper use and possible adverse effects of fluconazole.  All of the patient's questions and concerns were addressed. none

## 2023-03-01 NOTE — HISTORY OF PRESENT ILLNESS
[FreeTextEntry1] : Patient feels well , he is asymptomatic. His weight is unchanged. He is exercising regularly and following the diet. His blood glucose at home are well controlled, they are usually around 100 mg/dl. He denies low blood glucose during the night. He denies chest pain, or SOB. Denies numbness, tingling or burning sensation on his extremities. Taking his medications regularly. He has not seen the Ophthalmologist recently. He has not seen Podiatrist recently. He has not seen the Cardiologist recently.\par

## 2023-03-01 NOTE — ASSESSMENT
[FreeTextEntry1] : The diabetes is well controlled\par He has lost weight\par His renal function is fine, except for microalbuminuria\par Will continue same treatment\par Advised to see Ophthalmologist and the Podiatrist

## 2023-03-01 NOTE — DATA REVIEWED
[FreeTextEntry1] : The FBS and HbA1c have improved. The renal function is fine. The lipid panel was fine.

## 2023-04-15 ENCOUNTER — RX RENEWAL (OUTPATIENT)
Age: 73
End: 2023-04-15

## 2023-05-25 ENCOUNTER — RX RENEWAL (OUTPATIENT)
Age: 73
End: 2023-05-25

## 2023-06-16 ENCOUNTER — RX RENEWAL (OUTPATIENT)
Age: 73
End: 2023-06-16

## 2023-06-27 ENCOUNTER — APPOINTMENT (OUTPATIENT)
Dept: ENDOCRINOLOGY | Facility: CLINIC | Age: 73
End: 2023-06-27
Payer: COMMERCIAL

## 2023-06-27 VITALS
DIASTOLIC BLOOD PRESSURE: 76 MMHG | OXYGEN SATURATION: 98 % | RESPIRATION RATE: 16 BRPM | WEIGHT: 165 LBS | TEMPERATURE: 97.8 F | HEIGHT: 67 IN | BODY MASS INDEX: 25.9 KG/M2 | HEART RATE: 64 BPM | SYSTOLIC BLOOD PRESSURE: 119 MMHG

## 2023-06-27 PROCEDURE — 99214 OFFICE O/P EST MOD 30 MIN: CPT

## 2023-06-27 NOTE — ASSESSMENT
[FreeTextEntry1] : Patient is doing well\par He has lost weight\par The diabetes is well controlled\par Will reduce the Glipizide dose to 5 mg po QD\par No diabetic Retinopathy or Nephropathy\par The lipid panel was fine\par Advised to follow the diet and exercise regularly\par The thyroid tests were normal\par

## 2023-06-27 NOTE — HISTORY OF PRESENT ILLNESS
[FreeTextEntry1] : Patient feels well , he is asymptomatic. His weight is unchanged. He is exercising regularly and following the diet. His blood glucose at home are well controlled, they are usually around 110 mg/dl. He denies low blood glucose during the night. He denies chest pain, or SOB. Denies numbness, tingling or burning sensation on his extremities. Taking his medications regularly. He has seen the Ophthalmologist recently. He has not seen Podiatrist recently. He has not seen the Cardiologist recently.\par

## 2023-06-27 NOTE — DATA REVIEWED
[FreeTextEntry1] : The FBS and hbA1c indicates very good diabetic control. The lipid panel was fine. The renal function was fine. No microalbuminuria. The protein in the urine has improved.

## 2023-10-27 ENCOUNTER — APPOINTMENT (OUTPATIENT)
Dept: ENDOCRINOLOGY | Facility: CLINIC | Age: 73
End: 2023-10-27
Payer: COMMERCIAL

## 2023-10-27 VITALS
HEART RATE: 66 BPM | SYSTOLIC BLOOD PRESSURE: 124 MMHG | BODY MASS INDEX: 24.33 KG/M2 | OXYGEN SATURATION: 97 % | TEMPERATURE: 95.5 F | RESPIRATION RATE: 16 BRPM | WEIGHT: 155 LBS | HEIGHT: 67 IN | DIASTOLIC BLOOD PRESSURE: 87 MMHG

## 2023-10-27 LAB — GLUCOSE BLDC GLUCOMTR-MCNC: 98

## 2023-10-27 PROCEDURE — 99214 OFFICE O/P EST MOD 30 MIN: CPT | Mod: 25

## 2023-10-27 PROCEDURE — 82962 GLUCOSE BLOOD TEST: CPT

## 2024-03-01 ENCOUNTER — APPOINTMENT (OUTPATIENT)
Dept: ENDOCRINOLOGY | Facility: CLINIC | Age: 74
End: 2024-03-01
Payer: COMMERCIAL

## 2024-03-01 VITALS
SYSTOLIC BLOOD PRESSURE: 128 MMHG | WEIGHT: 159 LBS | RESPIRATION RATE: 16 BRPM | HEIGHT: 67 IN | OXYGEN SATURATION: 98 % | TEMPERATURE: 97 F | BODY MASS INDEX: 24.96 KG/M2 | HEART RATE: 63 BPM | DIASTOLIC BLOOD PRESSURE: 77 MMHG

## 2024-03-01 DIAGNOSIS — E11.9 TYPE 2 DIABETES MELLITUS W/OUT COMPLICATIONS: ICD-10-CM

## 2024-03-01 DIAGNOSIS — E78.00 PURE HYPERCHOLESTEROLEMIA, UNSPECIFIED: ICD-10-CM

## 2024-03-01 PROCEDURE — 99214 OFFICE O/P EST MOD 30 MIN: CPT | Mod: 25

## 2024-03-01 RX ORDER — SITAGLIPTIN 100 MG/1
100 TABLET, FILM COATED ORAL
Qty: 90 | Refills: 3 | Status: ACTIVE | COMMUNITY
Start: 2020-12-30 | End: 1900-01-01

## 2024-03-01 RX ORDER — GLIPIZIDE 5 MG/1
5 TABLET ORAL DAILY
Qty: 30 | Refills: 0 | Status: ACTIVE | COMMUNITY
Start: 2021-02-09 | End: 1900-01-01

## 2024-03-01 RX ORDER — LISINOPRIL 2.5 MG/1
2.5 TABLET ORAL
Qty: 90 | Refills: 3 | Status: ACTIVE | COMMUNITY
Start: 2022-05-03 | End: 1900-01-01

## 2024-03-01 NOTE — HISTORY OF PRESENT ILLNESS
[FreeTextEntry1] : Patient feels well, he is asymptomatic. No weight change. He is walking regularly and following the diet. His blood sugars at home are well controlled, they are usually around 120 mg/dl. He denies low blood glucose during the night. He denies chest pain, or SOB. Denies numbness, tingling or burning sensation on his extremities. Taking his medications regularly. He has not seen the Ophthalmologist recently. He has not seen the Podiatrist recently. He has not seen the Cardiologist recently.

## 2024-03-01 NOTE — ASSESSMENT
[FreeTextEntry1] : Patient is doing well His weight is stable The diabetes is well controlled No Diabetic Retinopathy or Nephropathy present at this time The lipid panel was fine Will continue the same treatment His BP was also fine Advised to take her medications regularly Advised to follow the diet and exercise regularly The thyroid tests were normal

## 2024-03-01 NOTE — DATA REVIEWED
[FreeTextEntry1] : The FBS and HbA1c indicates good diabetic control. The microalbuminuria has improved. The GFR is stable. The lipid panel was fine.

## 2024-04-03 NOTE — HISTORY OF PRESENT ILLNESS
[FreeTextEntry1] : Patient feels well , he is asymptomatic. His weight has not changed. He is exercising regularly and following the diet. His blood glucose at home are well controlled, they are usually around 120 mg/dl. The FBS in the laboratory was 116 mg/dl and the HbA1c was 7.4 % increased since last visit. The renal function is within normal limits, the microalbumin in the urine was normal. The lipid panel was within normal limits. He denies low blood glucose during the night. He denies chest pain, or SOB. Denies numbness, tingling or burning sensation on his extremities. Taking his medications regularly. He has not seen the Ophthalmologist recently. He has not seen Podiatrist recently. He has seen the Cardiologist recently, the examination was fine.\par 
left upper arm
left upper arm

## 2024-06-12 ENCOUNTER — RX RENEWAL (OUTPATIENT)
Age: 74
End: 2024-06-12

## 2024-06-12 RX ORDER — BLOOD SUGAR DIAGNOSTIC
STRIP MISCELLANEOUS
Qty: 200 | Refills: 3 | Status: ACTIVE | COMMUNITY
Start: 2019-02-01 | End: 1900-01-01

## 2024-06-29 ENCOUNTER — RX RENEWAL (OUTPATIENT)
Age: 74
End: 2024-06-29

## 2024-07-02 ENCOUNTER — APPOINTMENT (OUTPATIENT)
Dept: ENDOCRINOLOGY | Facility: CLINIC | Age: 74
End: 2024-07-02

## 2024-08-21 ENCOUNTER — RX RENEWAL (OUTPATIENT)
Age: 74
End: 2024-08-21

## 2024-11-05 ENCOUNTER — RX RENEWAL (OUTPATIENT)
Age: 74
End: 2024-11-05

## 2024-11-05 ENCOUNTER — TRANSCRIPTION ENCOUNTER (OUTPATIENT)
Age: 74
End: 2024-11-05

## 2024-11-07 ENCOUNTER — TRANSCRIPTION ENCOUNTER (OUTPATIENT)
Age: 74
End: 2024-11-07

## 2025-01-30 ENCOUNTER — APPOINTMENT (OUTPATIENT)
Dept: ENDOCRINOLOGY | Facility: CLINIC | Age: 75
End: 2025-01-30
Payer: COMMERCIAL

## 2025-01-30 VITALS
OXYGEN SATURATION: 99 % | HEART RATE: 70 BPM | SYSTOLIC BLOOD PRESSURE: 110 MMHG | RESPIRATION RATE: 16 BRPM | BODY MASS INDEX: 25.58 KG/M2 | TEMPERATURE: 96.9 F | WEIGHT: 163 LBS | HEIGHT: 67 IN | DIASTOLIC BLOOD PRESSURE: 70 MMHG

## 2025-01-30 DIAGNOSIS — E78.00 PURE HYPERCHOLESTEROLEMIA, UNSPECIFIED: ICD-10-CM

## 2025-01-30 DIAGNOSIS — E11.9 TYPE 2 DIABETES MELLITUS W/OUT COMPLICATIONS: ICD-10-CM

## 2025-01-30 PROCEDURE — 99214 OFFICE O/P EST MOD 30 MIN: CPT

## 2025-01-30 PROCEDURE — G2211 COMPLEX E/M VISIT ADD ON: CPT | Mod: NC

## 2025-02-06 RX ORDER — GLIPIZIDE 2.5 MG/1
2.5 TABLET, FILM COATED, EXTENDED RELEASE ORAL
Qty: 90 | Refills: 3 | Status: ACTIVE | COMMUNITY
Start: 2025-02-06 | End: 1900-01-01

## 2025-03-25 ENCOUNTER — RX RENEWAL (OUTPATIENT)
Age: 75
End: 2025-03-25

## 2025-05-16 ENCOUNTER — APPOINTMENT (OUTPATIENT)
Dept: ENDOCRINOLOGY | Facility: CLINIC | Age: 75
End: 2025-05-16
Payer: COMMERCIAL

## 2025-05-16 VITALS
SYSTOLIC BLOOD PRESSURE: 142 MMHG | OXYGEN SATURATION: 98 % | HEART RATE: 69 BPM | WEIGHT: 159 LBS | DIASTOLIC BLOOD PRESSURE: 85 MMHG | TEMPERATURE: 97 F | BODY MASS INDEX: 24.9 KG/M2 | RESPIRATION RATE: 16 BRPM

## 2025-05-16 DIAGNOSIS — E78.00 PURE HYPERCHOLESTEROLEMIA, UNSPECIFIED: ICD-10-CM

## 2025-05-16 DIAGNOSIS — E11.9 TYPE 2 DIABETES MELLITUS W/OUT COMPLICATIONS: ICD-10-CM

## 2025-05-16 PROCEDURE — G2211 COMPLEX E/M VISIT ADD ON: CPT | Mod: NC

## 2025-05-16 PROCEDURE — 99214 OFFICE O/P EST MOD 30 MIN: CPT

## 2025-06-09 ENCOUNTER — TRANSCRIPTION ENCOUNTER (OUTPATIENT)
Age: 75
End: 2025-06-09

## 2025-07-23 ENCOUNTER — RX RENEWAL (OUTPATIENT)
Age: 75
End: 2025-07-23

## 2025-09-10 ENCOUNTER — RX RENEWAL (OUTPATIENT)
Age: 75
End: 2025-09-10